# Patient Record
Sex: MALE | Race: WHITE | Employment: FULL TIME | URBAN - NONMETROPOLITAN AREA
[De-identification: names, ages, dates, MRNs, and addresses within clinical notes are randomized per-mention and may not be internally consistent; named-entity substitution may affect disease eponyms.]

---

## 2017-09-23 DIAGNOSIS — I10 ESSENTIAL HYPERTENSION: ICD-10-CM

## 2017-09-25 RX ORDER — AMLODIPINE BESYLATE 10 MG/1
TABLET ORAL
Qty: 30 TABLET | Refills: 0 | Status: SHIPPED | OUTPATIENT
Start: 2017-09-25 | End: 2019-10-15

## 2018-01-24 ENCOUNTER — OFFICE VISIT (OUTPATIENT)
Dept: FAMILY MEDICINE | Facility: OTHER | Age: 43
End: 2018-01-24
Attending: FAMILY MEDICINE

## 2018-01-24 VITALS
BODY MASS INDEX: 40.09 KG/M2 | TEMPERATURE: 98.2 F | WEIGHT: 280 LBS | SYSTOLIC BLOOD PRESSURE: 136 MMHG | DIASTOLIC BLOOD PRESSURE: 82 MMHG | HEIGHT: 70 IN | HEART RATE: 84 BPM | OXYGEN SATURATION: 96 %

## 2018-01-24 DIAGNOSIS — H61.21 EXCESSIVE CERUMEN IN RIGHT EAR CANAL: ICD-10-CM

## 2018-01-24 DIAGNOSIS — I10 BENIGN ESSENTIAL HYPERTENSION: ICD-10-CM

## 2018-01-24 DIAGNOSIS — Z00.8 ENCOUNTER FOR BIOMETRIC SCREENING: ICD-10-CM

## 2018-01-24 DIAGNOSIS — J06.9 VIRAL URI WITH COUGH: Primary | ICD-10-CM

## 2018-01-24 DIAGNOSIS — E66.01 MORBID OBESITY (H): ICD-10-CM

## 2018-01-24 DIAGNOSIS — J34.89 NASAL SORE: ICD-10-CM

## 2018-01-24 LAB
CHOLEST SERPL-MCNC: 234 MG/DL
GLUCOSE SERPL-MCNC: 103 MG/DL (ref 70–99)
HDLC SERPL-MCNC: 48 MG/DL
LDLC SERPL CALC-MCNC: 158 MG/DL
NONHDLC SERPL-MCNC: 186 MG/DL
TRIGL SERPL-MCNC: 138 MG/DL

## 2018-01-24 PROCEDURE — 82947 ASSAY GLUCOSE BLOOD QUANT: CPT | Performed by: FAMILY MEDICINE

## 2018-01-24 PROCEDURE — 99213 OFFICE O/P EST LOW 20 MIN: CPT | Performed by: FAMILY MEDICINE

## 2018-01-24 PROCEDURE — 80061 LIPID PANEL: CPT | Performed by: FAMILY MEDICINE

## 2018-01-24 PROCEDURE — 36415 COLL VENOUS BLD VENIPUNCTURE: CPT | Performed by: FAMILY MEDICINE

## 2018-01-24 ASSESSMENT — PAIN SCALES - GENERAL: PAINLEVEL: NO PAIN (0)

## 2018-01-24 NOTE — NURSING NOTE
"Chief Complaint   Patient presents with     URI       Initial /82 (BP Location: Left arm, Patient Position: Chair, Cuff Size: Adult Large)  Pulse 84  Temp 98.2  F (36.8  C) (Tympanic)  Ht 5' 10\" (1.778 m)  Wt 280 lb (127 kg)  SpO2 96%  BMI 40.18 kg/m2 Estimated body mass index is 40.18 kg/(m^2) as calculated from the following:    Height as of this encounter: 5' 10\" (1.778 m).    Weight as of this encounter: 280 lb (127 kg).  Medication Reconciliation: complete     Lesly Love   "

## 2018-01-24 NOTE — PROGRESS NOTES
"  SUBJECTIVE:   Alberto Vieira is a 42 year old male who presents to clinic today for the following health issues:      RESPIRATORY SYMPTOMS      Duration: 3 days    Description  Chills, hot flashes, stuffy nose, sore throat, dry cough, vomited once, diarrhea for 2 days, sneezing    Severity: moderate    Accompanying signs and symptoms: None    History (predisposing factors):  none    Precipitating or alleviating factors: None    Therapies tried and outcome:  None    Initially started feeling unwell with chills and sweats aout 7 days ago. No other symptoms, but resolve din 24 hrs. Three days ago started getting sinus congestion, drainage, dry cough, ear pressure. Also vomited once 3 days ago and has been having non bloody loose stools. No fevers. Minimal appetite. Stooling is better. Cough improved today. Has sore in the left nares that started with the nasal congestion. Large scab now noted. Needs note for work because he missed 2 days. 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     Problem list and histories reviewed & adjusted, as indicated.  Additional history: as documented    Labs reviewed in EPIC    Reviewed and updated as needed this visit by clinical staff  Tobacco  Allergies  Meds  Med Hx  Surg Hx  Fam Hx  Soc Hx      Reviewed and updated as needed this visit by Provider         ROS:  Constitutional, HEENT, cardiovascular, pulmonary, gi and gu systems are negative, except as otherwise noted.    OBJECTIVE:     /82 (BP Location: Left arm, Patient Position: Chair, Cuff Size: Adult Large)  Pulse 84  Temp 98.2  F (36.8  C) (Tympanic)  Ht 5' 10\" (1.778 m)  Wt 280 lb (127 kg)  SpO2 96%  BMI 40.18 kg/m2  Body mass index is 40.18 kg/(m^2).  GENERAL: healthy, alert and no distress  EYES: Eyes grossly normal to inspection,  conjunctivae and sclerae normal  HENT: Left TM occluded by wax, unable to clear with curette and lavage. Right TM clear. Large scabbed over lesion of left nares. Oropharynx non " erythematous.   NECK: no adenopathy, no asymmetry, masses, or scars and thyroid normal to palpation  RESP: lungs clear to auscultation - no rales, rhonchi or wheezes  CV: regular rate and rhythm, normal S1 S2, no S3 or S4, no murmur, click or rub, no peripheral edema   ABDOMEN: soft, nontender, no hepatosplenomegaly, no masses and bowel sounds normal  MS: no gross musculoskeletal defects noted, no edema    Diagnostic Test Results:  none     ASSESSMENT/PLAN:       1. Viral URI with cough  Several days or URI symptoms, actually seems to be improving today. Note for work given. Supportive cares discussed    2. Nasal sore  Apply Vaseline/antibiotic ointment to the area to keep moist. If not healing over the next weeks, please follow up .    3. Encounter for biometric screening  Brings in insurance form. Fasting. Will get labs today. Needs physical, he is aware.   - Lipid Profile (Chol, Trig, HDL, LDL calc)  - Glucose    4. Excessive cerumen in right ear canal  Unable to clear the right ear canal without significant discomfort. Referral for ear cleaning made.   - OTOLARYNGOLOGY REFERRAL    5. Morbid obesity (H) / Benign essential hypertension  BP borderline today. Lipids elevated today, ASCVD risk < 5%, however LDL is near cut off of 160. Advised discussion with PCP and weight loss. Pt is currently on a limited carb diet, encouraged this.     Aleksandra Lewis MD  [facility]

## 2018-01-24 NOTE — PATIENT INSTRUCTIONS

## 2018-01-24 NOTE — MR AVS SNAPSHOT
After Visit Summary   1/24/2018    Alberto Viiera    MRN: [id number]           Patient Information     Date Of Birth          1975        Visit Information        Provider Department      1/24/2018 11:30 AM Aleksandra Lewis MD [facility]        Today's Diagnoses     Viral URI with cough    -  1    Nasal sore        Encounter for biometric screening          Care Instructions      Viral Upper Respiratory Illness (Adult)  You have a viral upper respiratory illness (URI), which is another term for the common cold. This illness is contagious during the first few days. It is spread through the air by coughing and sneezing. It may also be spread by direct contact (touching the sick person and then touching your own eyes, nose, or mouth). Frequent handwashing will decrease risk of spread. Most viral illnesses go away within 7 to 10 days with rest and simple home remedies. Sometimes the illness may last for several weeks. Antibiotics will not kill a virus, and they are generally not prescribed for this condition.    Home care    If symptoms are severe, rest at home for the first 2 to 3 days. When you resume activity, don't let yourself get too tired.    Avoid being exposed to cigarette smoke (yours or others ).    You may use acetaminophen or ibuprofen to control pain and fever, unless another medicine was prescribed. (Note: If you have chronic liver or kidney disease, have ever had a stomach ulcer or gastrointestinal bleeding, or are taking blood-thinning medicines, talk with your healthcare provider before using these medicines.) Aspirin should never be given to anyone under 18 years of age who is ill with a viral infection or fever. It may cause severe liver or brain damage.    Your appetite may be poor, so a light diet is fine. Avoid dehydration by drinking 6 to 8 glasses of fluids per day (water, soft drinks, juices, tea, or soup). Extra fluids will help loosen secretions in the nose  and lungs.    Over-the-counter cold medicines will not shorten the length of time you re sick, but they may be helpful for the following symptoms: cough, sore throat, and nasal and sinus congestion. (Note: Do not use decongestants if you have high blood pressure.)  Follow-up care  Follow up with your healthcare provider, or as advised.  When to seek medical advice  Call your healthcare provider right away if any of these occur:    Cough with lots of colored sputum (mucus)    Severe headache; face, neck, or ear pain    Difficulty swallowing due to throat pain    Fever of 100.4 F (38 C)  Call 911, or get immediate medical care  Call emergency services right away if any of these occur:    Chest pain, shortness of breath, wheezing, or difficulty breathing    Coughing up blood    Inability to swallow due to throat pain  Date Last Reviewed: 9/13/2015 2000-2017 [employer]. [address] [place] [address]. All rights reserved. This information is not intended as a substitute for professional medical care. Always follow your healthcare professional's instructions.                Follow-ups after your visit        Follow-up notes from your care team     Return if symptoms worsen or fail to improve.      Who to contact     If you have questions or need follow up information about today's clinic visit or your schedule please contact [facility] directly at [phone].  Normal or non-critical lab and imaging results will be communicated to you by MyChart, letter or phone within 4 business days after the clinic has received the results. If you do not hear from us within 7 days, please contact the clinic through MyChart or phone. If you have a critical or abnormal lab result, we will notify you by phone as soon as possible.  Submit refill requests through [employer] or call your pharmacy and they will forward the refill request to us. Please allow 3 business days for your refill to be  "completed.          Additional Information About Your Visit        [employer] Information     [employer] lets you send messages to your doctor, view your test results, renew your prescriptions, schedule appointments and more. To sign up, go to [email or web address] . Click on \"Log in\" on the left side of the screen, which will take you to the Welcome page. Then click on \"Sign up Now\" on the right side of the page.     You will be asked to enter the access code listed below, as well as some personal information. Please follow the directions to create your username and password.     Your access code is: [email or web address]  Expires: 2018 11:55 AM     Your access code will  in 90 days. If you need help or a new code, please call your [place] clinic or [phone].        Care EveryWhere ID     This is your [facility] EveryWhere ID. This could be used by other organizations to access your [place] medical records  [id number]        Your Vitals Were     Pulse Temperature Height Pulse Oximetry BMI (Body Mass Index)       84 98.2  F (36.8  C) (Tympanic) 5' 10\" (1.778 m) 96% 40.18 kg/m2        Blood Pressure from Last 3 Encounters:   18 136/82   07/31/15 126/86   07/10/15 128/82    Weight from Last 3 Encounters:   18 280 lb (127 kg)   07/31/15 285 lb (129.3 kg)   07/10/15 280 lb (127 kg)              We Performed the Following     Glucose     Lipid Profile (Chol, Trig, HDL, LDL calc)        Primary Care Provider Office Phone # Fax #    SERENE Michael [phone]       [facility] [address] [address]        Equal Access to Services     ELIAS ZAPATA : Joe Browne, joshua arriazaadaha, qamarco kaalmada adeanayada, melvin srivastava. So [facility] [phone].    ATENCIÓN: Si habla español, tiene a begum disposición servicios gratuitos de asistencia lingüística. Jimi al [phone].    We comply with applicable federal civil rights " laws and [place] laws. We do not discriminate on the basis of race, color, national origin, age, disability, sex, sexual orientation, or gender identity.            Thank you!     Thank you for choosing [facility]  for your care. Our goal is always to provide you with excellent care. Hearing back from our patients is one way we can continue to improve our services. Please take a few minutes to complete the written survey that you may receive in the mail after your visit with us. Thank you!             Your Updated Medication List - Protect others around you: Learn how to safely use, store and throw away your medicines at www.disposemymeds.org.          This list is accurate as of 1/24/18 11:55 AM.  Always use your most recent med list.                   Brand Name Dispense Instructions for use Diagnosis    amLODIPine 10 MG tablet    NORVASC    30 tablet    TAKE ONE TABLET BY MOUTH ONCE DAILY    Essential hypertension       cyclobenzaprine 10 MG tablet    FLEXERIL    90 tablet    Take 1 tablet (10 mg) by mouth 3 times daily as needed for muscle spasms    Lumbar disc herniation with radiculopathy       naproxen 500 MG tablet    NAPROSYN    90 tablet    Take 1 tablet (500 mg) by mouth 3 times daily (with meals)    Lumbar disc herniation with radiculopathy

## 2018-01-24 NOTE — LETTER
January 24, 2018      lAberto Vieira  [address] [address] [address]        To Whom It May Concern:    Alberto Vieira  was seen on 1/24/2018.  Please excuse him from work  1/21-1/24 due to acute illness.        Sincerely,        Aleksandra Lewis MD

## 2018-01-29 ENCOUNTER — OFFICE VISIT (OUTPATIENT)
Dept: OTOLARYNGOLOGY | Facility: OTHER | Age: 43
End: 2018-01-29
Attending: FAMILY MEDICINE

## 2018-01-29 VITALS
WEIGHT: 280 LBS | TEMPERATURE: 98.3 F | DIASTOLIC BLOOD PRESSURE: 80 MMHG | HEART RATE: 72 BPM | OXYGEN SATURATION: 95 % | BODY MASS INDEX: 40.09 KG/M2 | HEIGHT: 70 IN | SYSTOLIC BLOOD PRESSURE: 138 MMHG

## 2018-01-29 DIAGNOSIS — Z71.6 TOBACCO ABUSE COUNSELING: ICD-10-CM

## 2018-01-29 DIAGNOSIS — H61.23 BILATERAL IMPACTED CERUMEN: Primary | ICD-10-CM

## 2018-01-29 DIAGNOSIS — H69.93 DYSFUNCTION OF BOTH EUSTACHIAN TUBES: ICD-10-CM

## 2018-01-29 DIAGNOSIS — J34.2 DNS (DEVIATED NASAL SEPTUM): ICD-10-CM

## 2018-01-29 PROCEDURE — 92504 EAR MICROSCOPY EXAMINATION: CPT | Performed by: NURSE PRACTITIONER

## 2018-01-29 PROCEDURE — 99213 OFFICE O/P EST LOW 20 MIN: CPT | Mod: 25 | Performed by: NURSE PRACTITIONER

## 2018-01-29 ASSESSMENT — PAIN SCALES - GENERAL: PAINLEVEL: NO PAIN (0)

## 2018-01-29 NOTE — MR AVS SNAPSHOT
After Visit Summary   1/29/2018    Alberto Vieira    MRN: [id number]           Patient Information     Date Of Birth          1975        Visit Information        Provider Department      1/29/2018 2:00 PM Latha Granger APRN CNP [facility]        Care Instructions    Ears cleaned today. Look good.    No q-tips.    Use Flonase 2 sprays once daily x 2-3 weeks.     Follow up in ENT as needed.      Thank you for allowing Latha Granger CNP and our ENT team to participate in your care.  If your medications are too expensive, please give the nurse a call.  We can possibly change this medication.  If you have a scheduling or an appointment question please contact [facility] Unit Coordinator at their direct line [phone].   ALL nursing questions or concerns can be directed to your ENT nurse at: [phone]- alex            Follow-ups after your visit        Follow-up notes from your care team     Return if symptoms worsen or fail to improve.      Who to contact     If you have questions or need follow up information about today's clinic visit or your schedule please contact [facility] directly at [phone].  Normal or non-critical lab and imaging results will be communicated to you by MyChart, letter or phone within 4 business days after the clinic has received the results. If you do not hear from us within 7 days, please contact the clinic through [employer] or phone. If you have a critical or abnormal lab result, we will notify you by phone as soon as possible.  Submit refill requests through [employer] or call your pharmacy and they will forward the refill request to us. Please allow 3 business days for your refill to be completed.          Additional Information About Your Visit        [employer] Information     [employer] lets you send messages to your doctor, view your test results, renew your prescriptions, schedule appointments and more. To sign up, go to  [email or web address] . Click on \"Log in\" on the left side of the screen, which will take you to the Welcome page. Then click on \"Sign up Now\" on the right side of the page.     You will be asked to enter the access code listed below, as well as some personal information. Please follow the directions to create your username and password.     Your access code is: [email or web address]  Expires: 2018 11:55 AM     Your access code will  in 90 days. If you need help or a new code, please call your [place] clinic or [phone].        Care EveryWhere ID     This is your Care EveryWhere ID. This could be used by other organizations to access your [place] medical records  [id number]        Your Vitals Were     Pulse Temperature Height Pulse Oximetry BMI (Body Mass Index)       72 98.3  F (36.8  C) (Tympanic) 5' 10\" (1.778 m) 95% 40.18 kg/m2        Blood Pressure from Last 3 Encounters:   18 138/80   18 136/82   07/31/15 126/86    Weight from Last 3 Encounters:   18 280 lb (127 kg)   18 280 lb (127 kg)   07/31/15 285 lb (129.3 kg)              Today, you had the following     No orders found for display       Primary Care Provider Office Phone # Fax #    RodySERENE Waddell [phone]       [address] [address]        Equal Access to Services     ELIAS ZAPATA : Hadii nerissa yost hadasho Soomaali, waaxda luqadaha, qaybta kaalmada adeegyada, melvin srivastava. So [facility] [phone].    ATENCIÓN: Si habla español, tiene a begum disposición servicios gratuitos de asistencia lingüística. Llame al [phone].    We comply with applicable federal civil rights laws and [place] laws. We do not discriminate on the basis of race, color, national origin, age, disability, sex, sexual orientation, or gender identity.            Thank you!     Thank you for choosing [facility]  for your care. Our goal is always " to provide you with excellent care. Hearing back from our patients is one way we can continue to improve our services. Please take a few minutes to complete the written survey that you may receive in the mail after your visit with us. Thank you!             Your Updated Medication List - Protect others around you: Learn how to safely use, store and throw away your medicines at www.disposemymeds.org.          This list is accurate as of 1/29/18  2:31 PM.  Always use your most recent med list.                   Brand Name Dispense Instructions for use Diagnosis    amLODIPine 10 MG tablet    NORVASC    30 tablet    TAKE ONE TABLET BY MOUTH ONCE DAILY    Essential hypertension       cyclobenzaprine 10 MG tablet    FLEXERIL    90 tablet    Take 1 tablet (10 mg) by mouth 3 times daily as needed for muscle spasms    Lumbar disc herniation with radiculopathy       naproxen 500 MG tablet    NAPROSYN    90 tablet    Take 1 tablet (500 mg) by mouth 3 times daily (with meals)    Lumbar disc herniation with radiculopathy

## 2018-01-29 NOTE — PROGRESS NOTES
Otolaryngology Note    Patient: Alberto Vieiar  : 1975         Chief Complaint:     Patient presents with:  Cerumen Impaction: ear cleaning         History of Present Illness:     Alberto Vieira is a 42 year old male seen today for ear cleaning. Was recently into clinic for URI, was told he had cerumen impaction.     Recent cold, feels ears intermittently plugged up. Typically can get his ears to pop. Some mild otalgia left ear last night, resolved since then. No otorrhea. Intermittent tinnitus x 1 year. No vertigo. No facial weakness/paresthesias or dysphagia. States he will try to use a paperclip at times to clean his ears.     Recent audiogram at work, states normal. Generally has no concerns with his hearing.   No COM, otologic surgeries or trauma.  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          Medications:     Current Outpatient Rx   Medication Sig Dispense Refill     amLODIPine (NORVASC) 10 MG tablet TAKE ONE TABLET BY MOUTH ONCE DAILY (Patient not taking: Reported on 2018) 30 tablet 0     cyclobenzaprine (FLEXERIL) 10 MG tablet Take 1 tablet (10 mg) by mouth 3 times daily as needed for muscle spasms (Patient not taking: Reported on 2018) 90 tablet 1     naproxen (NAPROSYN) 500 MG tablet Take 1 tablet (500 mg) by mouth 3 times daily (with meals) (Patient not taking: Reported on 2018) 90 tablet 6            Allergies:     Allergies: Review of patient's allergies indicates no known allergies.          Past Medical History:     No past medical history on file.         Past Surgical History:     No past surgical history on file.    ENT family history reviewed         Social History:     Social History   Substance Use Topics     Smoking status: Former Smoker     Smokeless tobacco: Current User     Types: Chew     Alcohol use Yes            Review of Systems:     ROS: 10  "point ROS neg other than the symptoms noted above in the HPI.         Physical Exam:     /80 (BP Location: Right arm, Patient Position: Chair, Cuff Size: Adult Large)  Pulse 72  Temp 98.3  F (36.8  C) (Tympanic)  Ht 5' 10\" (1.778 m)  Wt 280 lb (127 kg)  SpO2 95%  BMI 40.18 kg/m2  General - The patient is well nourished and well developed, and appears to have good nutritional status.  Alert and oriented to person and place, answers questions and cooperates with examination appropriately.   Head and Face - Normocephalic and atraumatic, with no gross asymmetry noted.  The facial nerve is intact, with strong symmetric movements.  Voice and Breathing - The patient was breathing comfortably without the use of accessory muscles. There was no wheezing, stridor. The patients voice was clear and strong, and had appropriate pitch and quality.  Ears - External ear normal. The ear canals were examined underneath the operating microscope and with an otologic speculum. The canals were cleaned of all debris with gently suctioning and use of alligator forceps. There is no granulation tissue or sign of cholesteatoma. The patient tolerates this well. TM's are intact without effusion.  Eyes - Extraocular movements intact, and the pupils were reactive to light. Sclera were not icteric or injected, conjunctiva were pink and moist.  Mouth - Examination of the oral cavity showed pink, healthy oral mucosa. Dentition in good condition. No lesions or ulcerations noted. The tongue was mobile and midline.   Throat - The walls of the oropharynx were smooth, pink, moist, symmetric, and had no lesions or ulcerations.  The tonsillar pillars and soft palate were symmetric. The uvula was midline on elevation.    Neck - Normal midline excursion of the laryngotracheal complex during swallowing.  Full range of motion on passive movement.  Palpation of the occipital, submental, submandibular, internal jugular chain, and supraclavicular nodes " did not demonstrate any abnormal lymph nodes or masses.  Palpation of the thyroid was soft and smooth, with no nodules or goiter appreciated.  The trachea was mobile and midline.  Nose - External contour is symmetric, no gross deflection or scars.  Nasal mucosa is pink and moist with no abnormal mucus.  The septum and turbinates were evaluated: DNS right.  No polyps, masses, or purulence noted on examination.         Assessment and Plan:     (H61.23) Bilateral impacted cerumen  (primary encounter diagnosis)  The ears were cleaned today. Aural hygiene for the ear canals was discussed.  Avoidance of Q-tips was highly encouraged. Advised against the use of paperclips. The patient was told to avoid flushing the ear canal as there is a risk of perforating the ear drum.  Recommend annual audiograms, sooner with any acute changes in hearing. The patient may return here as needed.    (J34.2) DNS (deviated nasal septum)  Comment: right, asymptomatic  Plan: Ongoing surveillance. Follow up PRN    (Z71.6) Tobacco abuse counseling  Discussed with patient that it takes 20 years of quitting tobacco to be at same risk of developing head and neck cancer as a person who has never used tobacco. Patient voiced understanding to ongoing risks associated with continued tobacco use. Tobacco cessation was strongly encouraged.    (H69.83) Eustachian Tube Dysfunction  Comment: Likely related to recent cold.  Plan: Education on eustachian tube dysfunction was provided.  This can occur in patients after an upper respiratory infection, as a complication of allergies, after altitude changes, as a sequelae of nasal anatomic abnormalities or as part of an genetic tendency.  Eustachian tube exercises were demonstrated and can be helpful.   Medical management can be in the form of decongestants used temporarily and with an understanding of side effects to blood pressure and prostate problems.  Nasal steroids can be of benefit and may take a few week  to obtain maximum benefit.  Antihistamines are another option.    If problems persist radiographic work-up for sinus problems can include a CT scan of the paranasal sinuses looking for anatomic abnormalities. A complete allergy work-up may also be obtained.  Recommend Flonase 2 sprays once daily x 2-3 weeks. Follow up PRN/symptoms do not improve after 6 weeks.  Recommended to f/u in ENT as needed.    Latha Granger NP  ENT  [facility], [place]  [phone]

## 2018-01-29 NOTE — NURSING NOTE
"Chief Complaint   Patient presents with     Cerumen Impaction     ear cleaning       Initial /80 (BP Location: Right arm, Patient Position: Chair, Cuff Size: Adult Large)  Pulse 72  Temp 98.3  F (36.8  C) (Tympanic)  Ht 5' 10\" (1.778 m)  Wt 280 lb (127 kg)  SpO2 95%  BMI 40.18 kg/m2 Estimated body mass index is 40.18 kg/(m^2) as calculated from the following:    Height as of this encounter: 5' 10\" (1.778 m).    Weight as of this encounter: 280 lb (127 kg).  Medication Reconciliation: complete     Jes Parisi LPN      "

## 2018-01-29 NOTE — LETTER
2018         RE: Alberto Vieira  [address] [facility] [address]        Dear Colleague,    Thank you for referring your patient, Alberto Vieira, to the [facility]. Please see a copy of my visit note below.    Otolaryngology Note    Patient: Alberto Vieira  : 1975         Chief Complaint:     Patient presents with:  Cerumen Impaction: ear cleaning         History of Present Illness:     Alberto Vieira is a 42 year old male seen today for ear cleaning. Was recently into clinic for URI, was told he had cerumen impaction.     Recent cold, feels ears intermittently plugged up. Typically can get his ears to pop. Some mild otalgia left ear last night, resolved since then. No otorrhea. Intermittent tinnitus x 1 year. No vertigo. No facial weakness/paresthesias or dysphagia. States he will try to use a paperclip at times to clean his ears.     Recent audiogram at work, states normal. Generally has no concerns with his hearing.   No COM, otologic surgeries or trauma.  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          Medications:     Current Outpatient Rx   Medication Sig Dispense Refill     amLODIPine (NORVASC) 10 MG tablet TAKE ONE TABLET BY MOUTH ONCE DAILY (Patient not taking: Reported on 2018) 30 tablet 0     cyclobenzaprine (FLEXERIL) 10 MG tablet Take 1 tablet (10 mg) by mouth 3 times daily as needed for muscle spasms (Patient not taking: Reported on 2018) 90 tablet 1     naproxen (NAPROSYN) 500 MG tablet Take 1 tablet (500 mg) by mouth 3 times daily (with meals) (Patient not taking: Reported on 2018) 90 tablet 6            Allergies:     Allergies: Review of patient's allergies indicates no known allergies.          Past Medical History:     No past medical history on file.         Past Surgical History:     No past surgical history on  "file.    ENT family history reviewed         Social History:     Social History   Substance Use Topics     Smoking status: Former Smoker     Smokeless tobacco: Current User     Types: Chew     Alcohol use Yes            Review of Systems:     ROS: 10 point ROS neg other than the symptoms noted above in the HPI.         Physical Exam:     /80 (BP Location: Right arm, Patient Position: Chair, Cuff Size: Adult Large)  Pulse 72  Temp 98.3  F (36.8  C) (Tympanic)  Ht 5' 10\" (1.778 m)  Wt 280 lb (127 kg)  SpO2 95%  BMI 40.18 kg/m2  General - The patient is well nourished and well developed, and appears to have good nutritional status.  Alert and oriented to person and place, answers questions and cooperates with examination appropriately.   Head and Face - Normocephalic and atraumatic, with no gross asymmetry noted.  The facial nerve is intact, with strong symmetric movements.  Voice and Breathing - The patient was breathing comfortably without the use of accessory muscles. There was no wheezing, stridor. The patients voice was clear and strong, and had appropriate pitch and quality.  Ears - External ear normal. The ear canals were examined underneath the operating microscope and with an otologic speculum. The canals were cleaned of all debris with gently suctioning and use of alligator forceps. There is no granulation tissue or sign of cholesteatoma. The patient tolerates this well. TM's are intact without effusion.  Eyes - Extraocular movements intact, and the pupils were reactive to light. Sclera were not icteric or injected, conjunctiva were pink and moist.  Mouth - Examination of the oral cavity showed pink, healthy oral mucosa. Dentition in good condition. No lesions or ulcerations noted. The tongue was mobile and midline.   Throat - The walls of the oropharynx were smooth, pink, moist, symmetric, and had no lesions or ulcerations.  The tonsillar pillars and soft palate were symmetric. The uvula was " midline on elevation.    Neck - Normal midline excursion of the laryngotracheal complex during swallowing.  Full range of motion on passive movement.  Palpation of the occipital, submental, submandibular, internal jugular chain, and supraclavicular nodes did not demonstrate any abnormal lymph nodes or masses.  Palpation of the thyroid was soft and smooth, with no nodules or goiter appreciated.  The trachea was mobile and midline.  Nose - External contour is symmetric, no gross deflection or scars.  Nasal mucosa is pink and moist with no abnormal mucus.  The septum and turbinates were evaluated: DNS right.  No polyps, masses, or purulence noted on examination.         Assessment and Plan:     (H61.23) Bilateral impacted cerumen  (primary encounter diagnosis)  The ears were cleaned today. Aural hygiene for the ear canals was discussed.  Avoidance of Q-tips was highly encouraged. Advised against the use of paperclips. The patient was told to avoid flushing the ear canal as there is a risk of perforating the ear drum.  Recommend annual audiograms, sooner with any acute changes in hearing. The patient may return here as needed.    (J34.2) DNS (deviated nasal septum)  Comment: right, asymptomatic  Plan: Ongoing surveillance. Follow up PRN    (Z71.6) Tobacco abuse counseling  Discussed with patient that it takes 20 years of quitting tobacco to be at same risk of developing head and neck cancer as a person who has never used tobacco. Patient voiced understanding to ongoing risks associated with continued tobacco use. Tobacco cessation was strongly encouraged.    (H69.83) Eustachian Tube Dysfunction  Comment: Likely related to recent cold.  Plan: Education on eustachian tube dysfunction was provided.  This can occur in patients after an upper respiratory infection, as a complication of allergies, after altitude changes, as a sequelae of nasal anatomic abnormalities or as part of an genetic tendency.  Eustachian tube  exercises were demonstrated and can be helpful.   Medical management can be in the form of decongestants used temporarily and with an understanding of side effects to blood pressure and prostate problems.  Nasal steroids can be of benefit and may take a few week to obtain maximum benefit.  Antihistamines are another option.    If problems persist radiographic work-up for sinus problems can include a CT scan of the paranasal sinuses looking for anatomic abnormalities. A complete allergy work-up may also be obtained.  Recommend Flonase 2 sprays once daily x 2-3 weeks. Follow up PRN/symptoms do not improve after 6 weeks.  Recommended to f/u in ENT as needed.    Latha Granger NP  ENT  [facility], [place]  [phone]      Again, thank you for allowing me to participate in the care of your patient.        Sincerely,        ERINN Hendricks CNP

## 2018-01-29 NOTE — PATIENT INSTRUCTIONS
Ears cleaned today. Look good.    No q-tips.    Use Flonase 2 sprays once daily x 2-3 weeks.     Follow up in ENT as needed.      Thank you for allowing Latha Granger CNP and our ENT team to participate in your care.  If your medications are too expensive, please give the nurse a call.  We can possibly change this medication.  If you have a scheduling or an appointment question please contact [facility] Coordinator at their direct line [phone].   ALL nursing questions or concerns can be directed to your ENT nurse at: [phone]- Vince

## 2019-10-09 NOTE — PROGRESS NOTES
Subjective     Alberto Vieira is a 44 year old male who presents to clinic today for the following health issues:    HPI   Musculoskeletal problem/pain      Duration: have had pain whole life in left hip     Description  Location: left hip a month ago flare up pain intense     Intensity:  moderate, severe    Accompanying signs and symptoms: radiation of pain to bottom of the foot    History  Previous similar problem: YES  Previous evaluation:  x-ray    Precipitating or alleviating factors:  Trauma or overuse: YES  Aggravating factors include: sitting, standing, walking, climbing stairs, lifting, exercise and overuse    Therapies tried and outcome: rest/inactivity, heat, ice, immobilization, stretching, acetaminophen, NSAID - health source in [place] - chiropractor    - last imaging 6/26/2015  - did get the injection 8/17/2015  - originial injury: splits playing boot hockey at age 13  - dx w/ AVN   - would like hip replacement  - only getting four hours of sleep per night d/t pain  - history of back injection w/ good relief and no need for repeat injection  - flexeril help with pain and sleep      # HTN  - was on amlodipine 10mg, but is not taking d/t weight loss and BP at goal      Patient Active Problem List   Diagnosis     Morbid obesity (H)     History reviewed. No pertinent surgical history.    Social History     Tobacco Use     Smoking status: Former Smoker     Smokeless tobacco: Current User     Types: Chew   Substance Use Topics     Alcohol use: Yes     Family History   Problem Relation Age of Onset     Heart Disease Father      Cerebrovascular Disease Maternal Grandfather          Current Outpatient Medications   Medication Sig Dispense Refill     cyclobenzaprine (FLEXERIL) 10 MG tablet Take 1 tablet (10 mg) by mouth 3 times daily as needed for muscle spasms 30 tablet 1     naproxen (NAPROSYN) 500 MG tablet Take 1 tablet (500 mg) by mouth 3 times daily (with meals) (Patient not taking: Reported on 1/24/2018)  90 tablet 6     No Known Allergies  BP Readings from Last 3 Encounters:   10/15/19 132/88   01/29/18 138/80   01/24/18 136/82    Wt Readings from Last 3 Encounters:   10/15/19 131.5 kg (290 lb)   01/29/18 127 kg (280 lb)   01/24/18 127 kg (280 lb)              Reviewed and updated as needed this visit by Provider  Tobacco  Allergies  Meds  Problems  Med Hx  Surg Hx  Fam Hx         Review of Systems   Constitutional: Negative for chills and fever.   HENT: Negative for congestion.    Respiratory: Negative for cough and shortness of breath.    Cardiovascular: Negative for chest pain and palpitations.   Gastrointestinal: Negative for abdominal pain, constipation, diarrhea, nausea and vomiting.   Musculoskeletal: Positive for arthralgias (left hip).            Objective    /88 (BP Location: Left leg, Patient Position: Sitting, Cuff Size: Adult Large)   Pulse 67   Temp 97.6  F (36.4  C)   Resp 20   Wt 131.5 kg (290 lb)   SpO2 96%   BMI 41.61 kg/m    Body mass index is 41.61 kg/m .  Physical Exam  Constitutional:       General: He is not in acute distress.     Appearance: He is obese.   Cardiovascular:      Rate and Rhythm: Normal rate and regular rhythm.      Pulses: Normal pulses.      Heart sounds: No murmur.   Pulmonary:      Effort: Pulmonary effort is normal. No respiratory distress.      Breath sounds: No wheezing or rales.   Abdominal:      General: Bowel sounds are normal. There is no distension.      Palpations: Abdomen is soft.      Tenderness: There is no tenderness.   Musculoskeletal:      Comments: Left hip: Flexion and extension full ROM, external rotation slightly reduced, very limited internal rotation to ~ 10 deg. Antalgic gait. Leg muscle atrophy when compared to right leg. Strength, hip flexion, knee extension/flexion 4/5. Neurobascularly intact.    Neurological:      Mental Status: He is alert.            Diagnostic Test Results:  Results for orders placed or performed in visit on  10/15/19 (from the past 24 hour(s))   Lipid Profile   Result Value Ref Range    Cholesterol 230 (H) <200 mg/dL    Triglycerides 150 (H) <150 mg/dL    HDL Cholesterol 39 (L) >39 mg/dL    LDL Cholesterol Calculated 161 (H) <100 mg/dL    Non HDL Cholesterol 191 (H) <130 mg/dL   CBC with platelets differential   Result Value Ref Range    WBC 5.0 4.0 - 11.0 10e9/L    RBC Count 5.49 4.4 - 5.9 10e12/L    Hemoglobin 17.4 13.3 - 17.7 g/dL    Hematocrit 50.9 40.0 - 53.0 %    MCV 93 78 - 100 fl    MCH 31.7 26.5 - 33.0 pg    MCHC 34.2 31.5 - 36.5 g/dL    RDW 11.9 10.0 - 15.0 %    Platelet Count 198 150 - 450 10e9/L    Diff Method Automated Method     % Neutrophils 45.5 %    % Lymphocytes 40.5 %    % Monocytes 11.0 %    % Eosinophils 1.8 %    % Basophils 1.0 %    % Immature Granulocytes 0.2 %    Nucleated RBCs 0 0 /100    Absolute Neutrophil 2.3 1.6 - 8.3 10e9/L    Absolute Lymphocytes 2.0 0.8 - 5.3 10e9/L    Absolute Monocytes 0.6 0.0 - 1.3 10e9/L    Absolute Eosinophils 0.1 0.0 - 0.7 10e9/L    Absolute Basophils 0.1 0.0 - 0.2 10e9/L    Abs Immature Granulocytes 0.0 0 - 0.4 10e9/L    Absolute Nucleated RBC 0.0    Comprehensive metabolic panel   Result Value Ref Range    Sodium 135 133 - 144 mmol/L    Potassium 4.4 3.4 - 5.3 mmol/L    Chloride 101 94 - 109 mmol/L    Carbon Dioxide 30 20 - 32 mmol/L    Anion Gap 4 3 - 14 mmol/L    Glucose 148 (H) 70 - 99 mg/dL    Urea Nitrogen 14 7 - 30 mg/dL    Creatinine 1.18 0.66 - 1.25 mg/dL    GFR Estimate 75 >60 mL/min/[1.73_m2]    GFR Estimate If Black 87 >60 mL/min/[1.73_m2]    Calcium 9.1 8.5 - 10.1 mg/dL    Bilirubin Total 0.7 0.2 - 1.3 mg/dL    Albumin 4.3 3.4 - 5.0 g/dL    Protein Total 8.3 6.8 - 8.8 g/dL    Alkaline Phosphatase 67 40 - 150 U/L     (H) 0 - 70 U/L    AST 44 0 - 45 U/L           Assessment & Plan     1. AVN (avascular necrosis of bone) (H)  - ORTHOPEDICS ADULT REFERRAL  - xray pelvis/left hip    2. Lumbar disc herniation with radiculopathy   cyclobenzaprine  (FLEXERIL) 10 MG tablet; Take 1 tablet (10 mg) by mouth 3 times daily as needed for muscle spasms  Dispense: 30 tablet; Refill: 1    3. Encounter for routine adult health examination without abnormal findings  - Lipid Profile  - CBC with platelets differential  - Comprehensive metabolic panel    4. Hyperglycemia: lab was not a fasting glucose  - repeat in 3 months and include A1c    5. Elevated ALT: most likely d/t fatty liver  - repeat labs in 3 months, if still elevated -> US RUQ, hepatitis labs, and consideration for GI consult      # Wellness:  - Immunizations: needs Tdap, influenza  - Alberto will make an appointment for a wellness visit  - Lipids today: ASCVD 3.0%, no statin indicated at this time       See Patient Instructions    Follow up in 3 months or sooner for wellness visit    Next visit:  - Labs: LFTs, A1c      Lata Morley MD  [facility] [place]

## 2019-10-15 ENCOUNTER — OFFICE VISIT (OUTPATIENT)
Dept: FAMILY MEDICINE | Facility: OTHER | Age: 44
End: 2019-10-15
Attending: FAMILY MEDICINE
Payer: COMMERCIAL

## 2019-10-15 VITALS
SYSTOLIC BLOOD PRESSURE: 132 MMHG | RESPIRATION RATE: 20 BRPM | DIASTOLIC BLOOD PRESSURE: 88 MMHG | WEIGHT: 290 LBS | BODY MASS INDEX: 41.61 KG/M2 | OXYGEN SATURATION: 96 % | TEMPERATURE: 97.6 F | HEART RATE: 67 BPM

## 2019-10-15 DIAGNOSIS — R74.01 ELEVATED ALT MEASUREMENT: ICD-10-CM

## 2019-10-15 DIAGNOSIS — Z00.00 ENCOUNTER FOR ROUTINE ADULT HEALTH EXAMINATION WITHOUT ABNORMAL FINDINGS: ICD-10-CM

## 2019-10-15 DIAGNOSIS — M87.00 AVN (AVASCULAR NECROSIS OF BONE) (H): Primary | ICD-10-CM

## 2019-10-15 DIAGNOSIS — R73.9 HYPERGLYCEMIA: ICD-10-CM

## 2019-10-15 DIAGNOSIS — M51.16 LUMBAR DISC HERNIATION WITH RADICULOPATHY: ICD-10-CM

## 2019-10-15 LAB
ALBUMIN SERPL-MCNC: 4.3 G/DL (ref 3.4–5)
ALP SERPL-CCNC: 67 U/L (ref 40–150)
ALT SERPL W P-5'-P-CCNC: 115 U/L (ref 0–70)
ANION GAP SERPL CALCULATED.3IONS-SCNC: 4 MMOL/L (ref 3–14)
AST SERPL W P-5'-P-CCNC: 44 U/L (ref 0–45)
BASOPHILS # BLD AUTO: 0.1 10E9/L (ref 0–0.2)
BASOPHILS NFR BLD AUTO: 1 %
BILIRUB SERPL-MCNC: 0.7 MG/DL (ref 0.2–1.3)
BUN SERPL-MCNC: 14 MG/DL (ref 7–30)
CALCIUM SERPL-MCNC: 9.1 MG/DL (ref 8.5–10.1)
CHLORIDE SERPL-SCNC: 101 MMOL/L (ref 94–109)
CHOLEST SERPL-MCNC: 230 MG/DL
CO2 SERPL-SCNC: 30 MMOL/L (ref 20–32)
CREAT SERPL-MCNC: 1.18 MG/DL (ref 0.66–1.25)
DIFFERENTIAL METHOD BLD: NORMAL
EOSINOPHIL # BLD AUTO: 0.1 10E9/L (ref 0–0.7)
EOSINOPHIL NFR BLD AUTO: 1.8 %
ERYTHROCYTE [DISTWIDTH] IN BLOOD BY AUTOMATED COUNT: 11.9 % (ref 10–15)
GFR SERPL CREATININE-BSD FRML MDRD: 75 ML/MIN/{1.73_M2}
GLUCOSE SERPL-MCNC: 148 MG/DL (ref 70–99)
HCT VFR BLD AUTO: 50.9 % (ref 40–53)
HDLC SERPL-MCNC: 39 MG/DL
HGB BLD-MCNC: 17.4 G/DL (ref 13.3–17.7)
IMM GRANULOCYTES # BLD: 0 10E9/L (ref 0–0.4)
IMM GRANULOCYTES NFR BLD: 0.2 %
LDLC SERPL CALC-MCNC: 161 MG/DL
LYMPHOCYTES # BLD AUTO: 2 10E9/L (ref 0.8–5.3)
LYMPHOCYTES NFR BLD AUTO: 40.5 %
MCH RBC QN AUTO: 31.7 PG (ref 26.5–33)
MCHC RBC AUTO-ENTMCNC: 34.2 G/DL (ref 31.5–36.5)
MCV RBC AUTO: 93 FL (ref 78–100)
MONOCYTES # BLD AUTO: 0.6 10E9/L (ref 0–1.3)
MONOCYTES NFR BLD AUTO: 11 %
NEUTROPHILS # BLD AUTO: 2.3 10E9/L (ref 1.6–8.3)
NEUTROPHILS NFR BLD AUTO: 45.5 %
NONHDLC SERPL-MCNC: 191 MG/DL
NRBC # BLD AUTO: 0 10*3/UL
NRBC BLD AUTO-RTO: 0 /100
PLATELET # BLD AUTO: 198 10E9/L (ref 150–450)
POTASSIUM SERPL-SCNC: 4.4 MMOL/L (ref 3.4–5.3)
PROT SERPL-MCNC: 8.3 G/DL (ref 6.8–8.8)
RBC # BLD AUTO: 5.49 10E12/L (ref 4.4–5.9)
SODIUM SERPL-SCNC: 135 MMOL/L (ref 133–144)
TRIGL SERPL-MCNC: 150 MG/DL
WBC # BLD AUTO: 5 10E9/L (ref 4–11)

## 2019-10-15 PROCEDURE — 36415 COLL VENOUS BLD VENIPUNCTURE: CPT | Performed by: FAMILY MEDICINE

## 2019-10-15 PROCEDURE — 80061 LIPID PANEL: CPT | Performed by: FAMILY MEDICINE

## 2019-10-15 PROCEDURE — 99213 OFFICE O/P EST LOW 20 MIN: CPT | Performed by: FAMILY MEDICINE

## 2019-10-15 PROCEDURE — 80053 COMPREHEN METABOLIC PANEL: CPT | Performed by: FAMILY MEDICINE

## 2019-10-15 PROCEDURE — 85025 COMPLETE CBC W/AUTO DIFF WBC: CPT | Performed by: FAMILY MEDICINE

## 2019-10-15 RX ORDER — CYCLOBENZAPRINE HCL 10 MG
10 TABLET ORAL 3 TIMES DAILY PRN
Qty: 90 TABLET | Refills: 1 | Status: SHIPPED | OUTPATIENT
Start: 2019-10-15 | End: 2019-10-15 | Stop reason: DRUGHIGH

## 2019-10-15 RX ORDER — CYCLOBENZAPRINE HCL 10 MG
10 TABLET ORAL 3 TIMES DAILY PRN
Qty: 30 TABLET | Refills: 1 | Status: SHIPPED | OUTPATIENT
Start: 2019-10-15 | End: 2020-07-27

## 2019-10-15 ASSESSMENT — ENCOUNTER SYMPTOMS
SHORTNESS OF BREATH: 0
CONSTIPATION: 0
CHILLS: 0
ARTHRALGIAS: 1
DIARRHEA: 0
PALPITATIONS: 0
COUGH: 0
FEVER: 0
VOMITING: 0
NAUSEA: 0
ABDOMINAL PAIN: 0

## 2019-10-15 ASSESSMENT — PAIN SCALES - GENERAL: PAINLEVEL: MODERATE PAIN (4)

## 2019-10-15 NOTE — NURSING NOTE
"Chief Complaint   Patient presents with     Musculoskeletal Problem       Initial /88 (BP Location: Left leg, Patient Position: Sitting, Cuff Size: Adult Large)   Pulse 67   Temp 97.6  F (36.4  C)   Resp 20   Wt 131.5 kg (290 lb)   SpO2 96%   BMI 41.61 kg/m   Estimated body mass index is 41.61 kg/m  as calculated from the following:    Height as of 1/29/18: 1.778 m (5' 10\").    Weight as of this encounter: 131.5 kg (290 lb).  Medication Reconciliation: complete  Janie Delgado LPN  "

## 2019-10-23 ENCOUNTER — TRANSFERRED RECORDS (OUTPATIENT)
Dept: HEALTH INFORMATION MANAGEMENT | Facility: HOSPITAL | Age: 44
End: 2019-10-23

## 2019-11-22 ENCOUNTER — OFFICE VISIT (OUTPATIENT)
Dept: FAMILY MEDICINE | Facility: OTHER | Age: 44
End: 2019-11-22
Attending: PHYSICIAN ASSISTANT
Payer: COMMERCIAL

## 2019-11-22 VITALS
DIASTOLIC BLOOD PRESSURE: 88 MMHG | WEIGHT: 285 LBS | HEART RATE: 72 BPM | SYSTOLIC BLOOD PRESSURE: 130 MMHG | BODY MASS INDEX: 40.89 KG/M2 | TEMPERATURE: 96.6 F

## 2019-11-22 DIAGNOSIS — Z01.818 PREOP GENERAL PHYSICAL EXAM: Primary | ICD-10-CM

## 2019-11-22 DIAGNOSIS — R73.09 ELEVATED GLUCOSE: ICD-10-CM

## 2019-11-22 LAB
EST. AVERAGE GLUCOSE BLD GHB EST-MCNC: 128 MG/DL
HBA1C MFR BLD: 6.1 % (ref 0–5.6)

## 2019-11-22 PROCEDURE — 99214 OFFICE O/P EST MOD 30 MIN: CPT | Mod: 25 | Performed by: PHYSICIAN ASSISTANT

## 2019-11-22 PROCEDURE — 36415 COLL VENOUS BLD VENIPUNCTURE: CPT | Performed by: PHYSICIAN ASSISTANT

## 2019-11-22 PROCEDURE — 93000 ELECTROCARDIOGRAM COMPLETE: CPT | Performed by: INTERNAL MEDICINE

## 2019-11-22 PROCEDURE — 83036 HEMOGLOBIN GLYCOSYLATED A1C: CPT | Performed by: PHYSICIAN ASSISTANT

## 2019-11-22 ASSESSMENT — PAIN SCALES - GENERAL: PAINLEVEL: NO PAIN (0)

## 2019-11-22 NOTE — NURSING NOTE
"Chief Complaint   Patient presents with     Pre-Op Exam       Initial /88   Pulse 72   Temp 96.6  F (35.9  C)   Wt 129.3 kg (285 lb)   BMI 40.89 kg/m   Estimated body mass index is 40.89 kg/m  as calculated from the following:    Height as of 1/29/18: 1.778 m (5' 10\").    Weight as of this encounter: 129.3 kg (285 lb).  Medication Reconciliation: complete  Jessa Behrman, LPN  "

## 2019-11-22 NOTE — PROGRESS NOTES
[facility] - [place]  [address]  [place] [address]  [phone]  Dept: [phone]    PRE-OP EVALUATION:  Today's date: 2019    Alberto Vieira (: 1975) presents for pre-operative evaluation assessment as requested by Dr. Garcia.  He requires evaluation and anesthesia risk assessment prior to undergoing surgery/procedure for treatment of left total hip arthroplasty direct anterior s/n .    Proposed Surgery/ Procedure: left total hip  Date of Surgery/ Procedure: 12-10-19  Time of Surgery/ Procedure: TBD  Hospital/Surgical [place] [facility] [place]  Primary Physician: Rody Lopez  Type of Anesthesia Anticipated: to be determined    Patient has a Health Care Directive or Living Will:  NO    1. NO - Do you have a history of heart attack, stroke, stent, bypass or surgery on an artery in the head, neck, heart or legs?  2. NO - Do you ever have any pain or discomfort in your chest?  3. NO - Do you have a history of  Heart Failure?  4. NO - Are you troubled by shortness of breath when: walking on the level, up a slight hill or at night?  5. NO - Do you currently have a cold, bronchitis or other respiratory infection?  6. NO - Do you have a cough, shortness of breath or wheezing?  7. NO - Do you sometimes get pains in the calves of your legs when you walk?  8. NO - Do you or anyone in your family have previous history of blood clots?  9. NO - Do you or does anyone in your family have a serious bleeding problem such as prolonged bleeding following surgeries or cuts?  10. NO - Have you ever had problems with anemia or been told to take iron pills?  11. NO - Have you had any abnormal blood loss such as black, tarry or bloody stools, or abnormal vaginal bleeding?  12. NO - Have you ever had a blood transfusion?  13. NO - Have you or any of your relatives ever had problems with anesthesia?  14. YES - DO YOU HAVE SLEEP APNEA, EXCESSIVE SNORING OR DAYTIME DROWSINESS? Pt has sleep apnea no CPAP  15. NO  - Do you have any prosthetic heart valves?  16. NO - Do you have prosthetic joints?  17. NO - Is there any chance that you may be pregnant?      HPI:     HPI related to upcoming procedure: has schedule hip replacement. This will be done on December 10 th and will be done by Dr. Garcia       See problem list for active medical problems.  Problems all longstanding and stable, except as noted/documented.  See ROS for pertinent symptoms related to these conditions.      MEDICAL HISTORY:     Patient Active Problem List    Diagnosis Date Noted     Elevated ALT measurement 10/15/2019     Priority: Medium     Hyperglycemia 10/15/2019     Priority: Medium     Lumbar disc herniation with radiculopathy 10/15/2019     Priority: Medium     AVN (avascular necrosis of bone) (H) 10/15/2019     Priority: Medium     Morbid obesity (H) 01/24/2018     Priority: Medium      History reviewed. No pertinent past medical history.  History reviewed. No pertinent surgical history.  Current Outpatient Medications   Medication Sig Dispense Refill     cyclobenzaprine (FLEXERIL) 10 MG tablet Take 1 tablet (10 mg) by mouth 3 times daily as needed for muscle spasms 30 tablet 1     naproxen (NAPROSYN) 500 MG tablet Take 1 tablet (500 mg) by mouth 3 times daily (with meals) (Patient not taking: Reported on 1/24/2018) 90 tablet 6     OTC products: None, except as noted above    No Known Allergies   Latex Allergy: NO    Social History     Tobacco Use     Smoking status: Former Smoker     Smokeless tobacco: Current User     Types: Chew   Substance Use Topics     Alcohol use: Yes     History   Drug Use No       REVIEW OF SYSTEMS:   CONSTITUTIONAL: NEGATIVE for fever, chills, change in weight  INTEGUMENTARY/SKIN: NEGATIVE for worrisome rashes, moles or lesions  EYES: NEGATIVE for vision changes or irritation  ENT/MOUTH: NEGATIVE for ear, mouth and throat problems  RESP: NEGATIVE for significant cough or SOB  BREAST: NEGATIVE for masses, tenderness or  discharge  CV: NEGATIVE for chest pain, palpitations or peripheral edema  GI: NEGATIVE for nausea, abdominal pain, heartburn, or change in bowel habits  : NEGATIVE for frequency, dysuria, or hematuria  MUSCULOSKELETAL:Positive :  Both hips have OA and his left hip has severe degermation and avascular necrosis.    NEURO: NEGATIVE for weakness, dizziness or paresthesias  ENDOCRINE: NEGATIVE for temperature intolerance, skin/hair changes  HEME: NEGATIVE for bleeding problems  PSYCHIATRIC: NEGATIVE for changes in mood or affect    EXAM:   /88   Pulse 72   Temp 96.6  F (35.9  C)   Wt 129.3 kg (285 lb)   BMI 40.89 kg/m      GENERAL APPEARANCE: healthy, alert and no distress     EYES: EOMI,  PERRL     HENT: ear canals and TM's normal and nose and mouth without ulcers or lesions     NECK: no adenopathy, no asymmetry, masses, or scars and thyroid normal to palpation     RESP: lungs clear to auscultation - no rales, rhonchi or wheezes     CV: regular rates and rhythm, normal S1 S2, no S3 or S4 and no murmur, click or rub     ABDOMEN:  soft, nontender, no HSM or masses and bowel sounds normal     MS: extremities normal- no gross deformities noted, no evidence of inflammation in joints, FROM in all extremities.     SKIN: no suspicious lesions or rashes     NEURO: Normal strength and tone, sensory exam grossly normal, mentation intact and speech normal     PSYCH: mentation appears normal. and affect normal/bright     LYMPHATICS: No cervical adenopathy    DIAGNOSTICS:       Recent Labs   Lab Test 10/15/19  1047 08/17/15  1147 07/31/15  1000   HGB 17.4  --   --      --   --      --  140   POTASSIUM 4.4  --  3.9   CR 1.18  --  1.19   A1C  --  5.7  --         IMPRESSION:   Reason for surgery/procedure: left hips replacement.   Diagnosis/reason for consult: medical clearance.     The proposed surgical procedure is considered INTERMEDIATE risk.    REVISED CARDIAC RISK INDEX  The patient has the following  serious cardiovascular risks for perioperative complications such as (MI, PE, VFib and 3  AV Block):  No serious cardiac risks  INTERPRETATION: 1 risks: Class II (low risk - 0.9% complication rate)    The patient has the following additional risks for perioperative complications:  No identified additional risks      ICD-10-CM    1. Preop general physical exam Z01.818        RECOMMENDATIONS:     --Consult hospital rounder / IM to assist post-op medical management    --Patient is on no chronic medications    APPROVAL GIVEN to proceed with proposed procedure, without further diagnostic evaluation       Signed Electronically by: SERENE Kendall    Copy of this evaluation report is provided to requesting physician.    [facility] Preop Guidelines    Revised Cardiac Risk Index

## 2019-11-23 ENCOUNTER — APPOINTMENT (OUTPATIENT)
Dept: LAB | Facility: OTHER | Age: 44
End: 2019-11-23
Attending: PHYSICIAN ASSISTANT
Payer: COMMERCIAL

## 2019-11-25 DIAGNOSIS — Z01.818 PREOP GENERAL PHYSICAL EXAM: ICD-10-CM

## 2019-11-25 LAB
ALBUMIN UR-MCNC: NEGATIVE MG/DL
APPEARANCE UR: CLEAR
BILIRUB UR QL STRIP: NEGATIVE
COLOR UR AUTO: ABNORMAL
GLUCOSE UR STRIP-MCNC: NEGATIVE MG/DL
HGB UR QL STRIP: NEGATIVE
KETONES UR STRIP-MCNC: NEGATIVE MG/DL
LEUKOCYTE ESTERASE UR QL STRIP: NEGATIVE
NITRATE UR QL: NEGATIVE
PH UR STRIP: 5 PH (ref 4.7–8)
SOURCE: ABNORMAL
SP GR UR STRIP: 1 (ref 1–1.03)
UROBILINOGEN UR STRIP-MCNC: NORMAL MG/DL (ref 0–2)

## 2019-11-25 PROCEDURE — 81003 URINALYSIS AUTO W/O SCOPE: CPT | Performed by: PHYSICIAN ASSISTANT

## 2019-12-03 NOTE — OR NURSING
Email sent to total joint replacement team at [facility] as follows;    We have Alberto Vieira : 77 coming 12/10 for LTHA with Dr. Garcia, PCP OLAF Lopez.  Alberto did not attend a total joint replacement class.  He plans on going to his home in [place] the day after surgery with help from significant other.  He does not have any equipment at home.  He lives in one story home, laundry in basement, three steps to enter home.  He has a cat.    Education provided regarding  Call don t Fall , Capnography Monitoring, and Signs & Symptoms of Infection to report/watch and prevent.  Alberto verbalized good understanding of all topics discussed.    Thank you,  Meahgan Saab R.N.  Pre-Anesthesia Testing Surgical Education  [facility]

## 2019-12-05 ENCOUNTER — ANESTHESIA EVENT (OUTPATIENT)
Dept: SURGERY | Facility: HOSPITAL | Age: 44
DRG: 470 | End: 2019-12-05
Payer: COMMERCIAL

## 2019-12-05 ASSESSMENT — LIFESTYLE VARIABLES: TOBACCO_USE: 1

## 2019-12-05 NOTE — ANESTHESIA PREPROCEDURE EVALUATION
Anesthesia Pre-Procedure Evaluation    Patient: Alberto Vieira   MRN: [id number] : 1975          Preoperative Diagnosis: * No pre-op diagnosis entered *    Procedure(s):  LEFT TOTAL HIP ARTHROPLASTY DIRECT ANTERIOR S/N    No past medical history on file.  History reviewed. No pertinent surgical history.    Anesthesia Evaluation     . Pt has not had prior anesthetic            ROS/MED HX    ENT/Pulmonary:     (+)sleep apnea, tobacco use, Past use doesn't use CPAP , . .    Neurologic:  - neg neurologic ROS     Cardiovascular:  - neg cardiovascular ROS   (+) ----. : . . . :. . Previous cardiac testing date:results:date: results:ECG reviewed date:19 results:sb date: results:          METS/Exercise Tolerance:  >4 METS   Hematologic:         Musculoskeletal:   (+) arthritis,  other musculoskeletal- lumbar disc herniation       GI/Hepatic:  - neg GI/hepatic ROS       Renal/Genitourinary:  - ROS Renal section negative       Endo:     (+) Obesity, .      Psychiatric:  - neg psychiatric ROS       Infectious Disease:  - neg infectious disease ROS       Malignancy:      - no malignancy   Other:    (+) C-spine cleared: N/A, no H/O Chronic Pain,no other significant disability                         Physical Exam  Normal systems: pulmonary    Airway   Mallampati: II  TM distance: >3 FB  Neck ROM: full    Dental   (+) loose    Cardiovascular   Rhythm and rate: irregular and normal      Pulmonary    breath sounds clear to auscultation            Lab Results   Component Value Date    WBC 5.0 10/15/2019    HGB 17.4 10/15/2019    HCT 50.9 10/15/2019     10/15/2019     10/15/2019    POTASSIUM 4.4 10/15/2019    CHLORIDE 101 10/15/2019    CO2 30 10/15/2019    BUN 14 10/15/2019    CR 1.18 10/15/2019     (H) 10/15/2019    SCOOTER 9.1 10/15/2019    ALBUMIN 4.3 10/15/2019    PROTTOTAL 8.3 10/15/2019     (H) 10/15/2019    AST 44 10/15/2019    ALKPHOS 67 10/15/2019    BILITOTAL 0.7 10/15/2019    TSH 2.11  "07/31/2015       Preop Vitals  BP Readings from Last 3 Encounters:   11/22/19 130/88   10/15/19 132/88   01/29/18 138/80    Pulse Readings from Last 3 Encounters:   11/22/19 72   10/15/19 67   01/29/18 72      Resp Readings from Last 3 Encounters:   10/15/19 20   07/10/15 20   06/17/15 16    SpO2 Readings from Last 3 Encounters:   10/15/19 96%   01/29/18 95%   01/24/18 96%      Temp Readings from Last 1 Encounters:   11/22/19 96.6  F (35.9  C)    Ht Readings from Last 1 Encounters:   01/29/18 1.778 m (5' 10\")      Wt Readings from Last 1 Encounters:   11/22/19 129.3 kg (285 lb)    Estimated body mass index is 40.89 kg/m  as calculated from the following:    Height as of 1/29/18: 1.778 m (5' 10\").    Weight as of 11/22/19: 129.3 kg (285 lb).       Anesthesia Plan      History & Physical Review  History and physical reviewed and following examination; no interval change.    ASA Status:  3 .        Plan for Spinal with Other induction. Maintenance will be TIVA.    PONV prophylaxis:  Ondansetron (or other 5HT-3) and Dexamethasone or Solumedrol  Hp 11/22/19      Postoperative Care  Postoperative pain management:  IV analgesics and Neuraxial analgesia.      Consents  Anesthetic plan, risks, benefits and alternatives discussed with:  Patient..                 ERINN Jones CRNA  "

## 2019-12-10 ENCOUNTER — ANESTHESIA (OUTPATIENT)
Dept: SURGERY | Facility: HOSPITAL | Age: 44
DRG: 470 | End: 2019-12-10
Payer: COMMERCIAL

## 2019-12-10 ENCOUNTER — APPOINTMENT (OUTPATIENT)
Dept: GENERAL RADIOLOGY | Facility: HOSPITAL | Age: 44
DRG: 470 | End: 2019-12-10
Attending: ORTHOPAEDIC SURGERY
Payer: COMMERCIAL

## 2019-12-10 ENCOUNTER — HOSPITAL ENCOUNTER (INPATIENT)
Facility: HOSPITAL | Age: 44
LOS: 1 days | Discharge: HOME | DRG: 470 | End: 2019-12-11
Attending: ORTHOPAEDIC SURGERY | Admitting: ORTHOPAEDIC SURGERY
Payer: COMMERCIAL

## 2019-12-10 ENCOUNTER — APPOINTMENT (OUTPATIENT)
Dept: PHYSICAL THERAPY | Facility: HOSPITAL | Age: 44
DRG: 470 | End: 2019-12-10
Attending: ORTHOPAEDIC SURGERY
Payer: COMMERCIAL

## 2019-12-10 DIAGNOSIS — Z96.642 STATUS POST LEFT HIP REPLACEMENT: Primary | ICD-10-CM

## 2019-12-10 PROCEDURE — 40000305 ZZH STATISTIC PRE PROC ASSESS I: Performed by: ORTHOPAEDIC SURGERY

## 2019-12-10 PROCEDURE — C9290 INJ, BUPIVACAINE LIPOSOME: HCPCS | Performed by: ORTHOPAEDIC SURGERY

## 2019-12-10 PROCEDURE — 88300 SURGICAL PATH GROSS: CPT | Mod: TC | Performed by: ORTHOPAEDIC SURGERY

## 2019-12-10 PROCEDURE — 25800030 ZZH RX IP 258 OP 636: Performed by: ORTHOPAEDIC SURGERY

## 2019-12-10 PROCEDURE — 25000125 ZZHC RX 250: Performed by: ORTHOPAEDIC SURGERY

## 2019-12-10 PROCEDURE — 40000985 XR PELVIS PORT 1/2 VW: Mod: TC

## 2019-12-10 PROCEDURE — 71000014 ZZH RECOVERY PHASE 1 LEVEL 2 FIRST HR: Performed by: ORTHOPAEDIC SURGERY

## 2019-12-10 PROCEDURE — 25000128 H RX IP 250 OP 636: Performed by: ORTHOPAEDIC SURGERY

## 2019-12-10 PROCEDURE — 27210794 ZZH OR GENERAL SUPPLY STERILE: Performed by: ORTHOPAEDIC SURGERY

## 2019-12-10 PROCEDURE — 36000063 ZZH SURGERY LEVEL 4 EA 15 ADDTL MIN: Performed by: ORTHOPAEDIC SURGERY

## 2019-12-10 PROCEDURE — C1776 JOINT DEVICE (IMPLANTABLE): HCPCS | Performed by: ORTHOPAEDIC SURGERY

## 2019-12-10 PROCEDURE — 0SRB06Z REPLACEMENT OF LEFT HIP JOINT WITH OXIDIZED ZIRCONIUM ON POLYETHYLENE SYNTHETIC SUBSTITUTE, OPEN APPROACH: ICD-10-PCS | Performed by: ORTHOPAEDIC SURGERY

## 2019-12-10 PROCEDURE — 25800030 ZZH RX IP 258 OP 636: Performed by: NURSE ANESTHETIST, CERTIFIED REGISTERED

## 2019-12-10 PROCEDURE — 27130 TOTAL HIP ARTHROPLASTY: CPT | Performed by: NURSE ANESTHETIST, CERTIFIED REGISTERED

## 2019-12-10 PROCEDURE — 27110028 ZZH OR GENERAL SUPPLY NON-STERILE: Performed by: ORTHOPAEDIC SURGERY

## 2019-12-10 PROCEDURE — 71000015 ZZH RECOVERY PHASE 1 LEVEL 2 EA ADDTL HR: Performed by: ORTHOPAEDIC SURGERY

## 2019-12-10 PROCEDURE — 37000009 ZZH ANESTHESIA TECHNICAL FEE, EACH ADDTL 15 MIN: Performed by: ORTHOPAEDIC SURGERY

## 2019-12-10 PROCEDURE — 25000125 ZZHC RX 250: Performed by: NURSE ANESTHETIST, CERTIFIED REGISTERED

## 2019-12-10 PROCEDURE — 40000277 XR SURGERY CARM FLUORO LESS THAN 5 MIN W STILLS: Mod: TC

## 2019-12-10 PROCEDURE — 25000132 ZZH RX MED GY IP 250 OP 250 PS 637: Performed by: INTERNAL MEDICINE

## 2019-12-10 PROCEDURE — 12000000 ZZH R&B MED SURG/OB

## 2019-12-10 PROCEDURE — 25000132 ZZH RX MED GY IP 250 OP 250 PS 637: Performed by: ORTHOPAEDIC SURGERY

## 2019-12-10 PROCEDURE — 36000065 ZZH SURGERY LEVEL 4 W FLUORO 1ST 30 MIN: Performed by: ORTHOPAEDIC SURGERY

## 2019-12-10 PROCEDURE — 37000008 ZZH ANESTHESIA TECHNICAL FEE, 1ST 30 MIN: Performed by: ORTHOPAEDIC SURGERY

## 2019-12-10 PROCEDURE — 25000128 H RX IP 250 OP 636: Performed by: NURSE ANESTHETIST, CERTIFIED REGISTERED

## 2019-12-10 PROCEDURE — 40000275 ZZH STATISTIC RCP TIME EA 10 MIN

## 2019-12-10 PROCEDURE — 97161 PT EVAL LOW COMPLEX 20 MIN: CPT | Mod: GP | Performed by: PHYSICAL THERAPIST

## 2019-12-10 DEVICE — IMPLANTABLE DEVICE: Type: IMPLANTABLE DEVICE | Site: HIP | Status: FUNCTIONAL

## 2019-12-10 DEVICE — THREADED HOLE COVER-REFLECTION: Type: IMPLANTABLE DEVICE | Site: HIP | Status: FUNCTIONAL

## 2019-12-10 DEVICE — SCREW-SPHERICAL HEAD CANCELLOUS 25MM: Type: IMPLANTABLE DEVICE | Site: HIP | Status: FUNCTIONAL

## 2019-12-10 RX ORDER — ACETAMINOPHEN 325 MG/1
975 TABLET ORAL EVERY 8 HOURS
Status: DISCONTINUED | OUTPATIENT
Start: 2019-12-10 | End: 2019-12-10

## 2019-12-10 RX ORDER — PROPOFOL 10 MG/ML
INJECTION, EMULSION INTRAVENOUS CONTINUOUS PRN
Status: DISCONTINUED | OUTPATIENT
Start: 2019-12-10 | End: 2019-12-10

## 2019-12-10 RX ORDER — BUPIVACAINE HYDROCHLORIDE 7.5 MG/ML
INJECTION, SOLUTION INTRASPINAL PRN
Status: DISCONTINUED | OUTPATIENT
Start: 2019-12-10 | End: 2019-12-10

## 2019-12-10 RX ORDER — SODIUM CHLORIDE, SODIUM LACTATE, POTASSIUM CHLORIDE, CALCIUM CHLORIDE 600; 310; 30; 20 MG/100ML; MG/100ML; MG/100ML; MG/100ML
INJECTION, SOLUTION INTRAVENOUS CONTINUOUS
Status: DISCONTINUED | OUTPATIENT
Start: 2019-12-10 | End: 2019-12-10 | Stop reason: HOSPADM

## 2019-12-10 RX ORDER — LIDOCAINE HYDROCHLORIDE 20 MG/ML
INJECTION, SOLUTION INFILTRATION; PERINEURAL PRN
Status: DISCONTINUED | OUTPATIENT
Start: 2019-12-10 | End: 2019-12-10

## 2019-12-10 RX ORDER — ONDANSETRON 4 MG/1
4 TABLET, ORALLY DISINTEGRATING ORAL EVERY 30 MIN PRN
Status: DISCONTINUED | OUTPATIENT
Start: 2019-12-10 | End: 2019-12-10 | Stop reason: HOSPADM

## 2019-12-10 RX ORDER — FENTANYL CITRATE 50 UG/ML
INJECTION, SOLUTION INTRAMUSCULAR; INTRAVENOUS PRN
Status: DISCONTINUED | OUTPATIENT
Start: 2019-12-10 | End: 2019-12-10

## 2019-12-10 RX ORDER — PHENYLEPHRINE HYDROCHLORIDE 10 MG/ML
INJECTION INTRAVENOUS PRN
Status: DISCONTINUED | OUTPATIENT
Start: 2019-12-10 | End: 2019-12-10

## 2019-12-10 RX ORDER — OXYCODONE HYDROCHLORIDE 5 MG/1
5-10 TABLET ORAL
Status: DISCONTINUED | OUTPATIENT
Start: 2019-12-10 | End: 2019-12-10 | Stop reason: ALTCHOICE

## 2019-12-10 RX ORDER — ONDANSETRON 2 MG/ML
4 INJECTION INTRAMUSCULAR; INTRAVENOUS EVERY 30 MIN PRN
Status: DISCONTINUED | OUTPATIENT
Start: 2019-12-10 | End: 2019-12-10 | Stop reason: HOSPADM

## 2019-12-10 RX ORDER — HYDROXYZINE HYDROCHLORIDE 25 MG/1
25 TABLET, FILM COATED ORAL EVERY 6 HOURS PRN
Status: DISCONTINUED | OUTPATIENT
Start: 2019-12-10 | End: 2019-12-11 | Stop reason: HOSPADM

## 2019-12-10 RX ORDER — ONDANSETRON 4 MG/1
4 TABLET, ORALLY DISINTEGRATING ORAL EVERY 6 HOURS PRN
Status: DISCONTINUED | OUTPATIENT
Start: 2019-12-10 | End: 2019-12-11 | Stop reason: HOSPADM

## 2019-12-10 RX ORDER — BISACODYL 10 MG
10 SUPPOSITORY, RECTAL RECTAL DAILY PRN
Status: DISCONTINUED | OUTPATIENT
Start: 2019-12-10 | End: 2019-12-11 | Stop reason: HOSPADM

## 2019-12-10 RX ORDER — LIDOCAINE 40 MG/G
CREAM TOPICAL
Status: DISCONTINUED | OUTPATIENT
Start: 2019-12-10 | End: 2019-12-10 | Stop reason: HOSPADM

## 2019-12-10 RX ORDER — HYDROMORPHONE HYDROCHLORIDE 1 MG/ML
.3-.5 INJECTION, SOLUTION INTRAMUSCULAR; INTRAVENOUS; SUBCUTANEOUS EVERY 5 MIN PRN
Status: DISCONTINUED | OUTPATIENT
Start: 2019-12-10 | End: 2019-12-10 | Stop reason: HOSPADM

## 2019-12-10 RX ORDER — ACETAMINOPHEN 325 MG/1
650 TABLET ORAL EVERY 4 HOURS PRN
Status: DISCONTINUED | OUTPATIENT
Start: 2019-12-13 | End: 2019-12-11 | Stop reason: HOSPADM

## 2019-12-10 RX ORDER — NALOXONE HYDROCHLORIDE 0.4 MG/ML
.1-.4 INJECTION, SOLUTION INTRAMUSCULAR; INTRAVENOUS; SUBCUTANEOUS
Status: DISCONTINUED | OUTPATIENT
Start: 2019-12-10 | End: 2019-12-11 | Stop reason: HOSPADM

## 2019-12-10 RX ORDER — ONDANSETRON 2 MG/ML
4 INJECTION INTRAMUSCULAR; INTRAVENOUS EVERY 6 HOURS PRN
Status: DISCONTINUED | OUTPATIENT
Start: 2019-12-10 | End: 2019-12-11 | Stop reason: HOSPADM

## 2019-12-10 RX ORDER — FENTANYL CITRATE 50 UG/ML
25-50 INJECTION, SOLUTION INTRAMUSCULAR; INTRAVENOUS
Status: DISCONTINUED | OUTPATIENT
Start: 2019-12-10 | End: 2019-12-10 | Stop reason: HOSPADM

## 2019-12-10 RX ORDER — HYDROMORPHONE HYDROCHLORIDE 1 MG/ML
0.2 INJECTION, SOLUTION INTRAMUSCULAR; INTRAVENOUS; SUBCUTANEOUS
Status: DISCONTINUED | OUTPATIENT
Start: 2019-12-10 | End: 2019-12-11 | Stop reason: HOSPADM

## 2019-12-10 RX ORDER — LIDOCAINE 40 MG/G
CREAM TOPICAL
Status: DISCONTINUED | OUTPATIENT
Start: 2019-12-10 | End: 2019-12-11 | Stop reason: HOSPADM

## 2019-12-10 RX ORDER — SODIUM CHLORIDE 9 MG/ML
INJECTION, SOLUTION INTRAVENOUS CONTINUOUS
Status: DISCONTINUED | OUTPATIENT
Start: 2019-12-10 | End: 2019-12-11 | Stop reason: HOSPADM

## 2019-12-10 RX ORDER — PROPOFOL 10 MG/ML
INJECTION, EMULSION INTRAVENOUS PRN
Status: DISCONTINUED | OUTPATIENT
Start: 2019-12-10 | End: 2019-12-10

## 2019-12-10 RX ORDER — BUPIVACAINE HYDROCHLORIDE AND EPINEPHRINE 2.5; 5 MG/ML; UG/ML
INJECTION, SOLUTION INFILTRATION; PERINEURAL PRN
Status: DISCONTINUED | OUTPATIENT
Start: 2019-12-10 | End: 2019-12-10 | Stop reason: HOSPADM

## 2019-12-10 RX ORDER — AMOXICILLIN 250 MG
2 CAPSULE ORAL 2 TIMES DAILY
Status: DISCONTINUED | OUTPATIENT
Start: 2019-12-10 | End: 2019-12-11 | Stop reason: HOSPADM

## 2019-12-10 RX ORDER — CYCLOBENZAPRINE HCL 10 MG
10 TABLET ORAL 3 TIMES DAILY PRN
Status: DISCONTINUED | OUTPATIENT
Start: 2019-12-10 | End: 2019-12-11 | Stop reason: HOSPADM

## 2019-12-10 RX ORDER — POLYETHYLENE GLYCOL 3350 17 G/17G
17 POWDER, FOR SOLUTION ORAL DAILY
Status: DISCONTINUED | OUTPATIENT
Start: 2019-12-10 | End: 2019-12-11 | Stop reason: HOSPADM

## 2019-12-10 RX ORDER — ONDANSETRON 2 MG/ML
INJECTION INTRAMUSCULAR; INTRAVENOUS PRN
Status: DISCONTINUED | OUTPATIENT
Start: 2019-12-10 | End: 2019-12-10

## 2019-12-10 RX ORDER — NALOXONE HYDROCHLORIDE 0.4 MG/ML
.1-.4 INJECTION, SOLUTION INTRAMUSCULAR; INTRAVENOUS; SUBCUTANEOUS
Status: ACTIVE | OUTPATIENT
Start: 2019-12-10 | End: 2019-12-11

## 2019-12-10 RX ORDER — HYDROCODONE BITARTRATE AND ACETAMINOPHEN 5; 325 MG/1; MG/1
1-2 TABLET ORAL EVERY 4 HOURS PRN
Status: DISCONTINUED | OUTPATIENT
Start: 2019-12-10 | End: 2019-12-11 | Stop reason: HOSPADM

## 2019-12-10 RX ADMIN — SODIUM CHLORIDE, POTASSIUM CHLORIDE, SODIUM LACTATE AND CALCIUM CHLORIDE: 600; 310; 30; 20 INJECTION, SOLUTION INTRAVENOUS at 08:27

## 2019-12-10 RX ADMIN — HYDROCODONE BITARTRATE AND ACETAMINOPHEN 1 TABLET: 5; 325 TABLET ORAL at 18:35

## 2019-12-10 RX ADMIN — MIDAZOLAM 2 MG: 1 INJECTION INTRAMUSCULAR; INTRAVENOUS at 08:50

## 2019-12-10 RX ADMIN — HYDROCODONE BITARTRATE AND ACETAMINOPHEN 1 TABLET: 5; 325 TABLET ORAL at 17:04

## 2019-12-10 RX ADMIN — SODIUM CHLORIDE: 9 INJECTION, SOLUTION INTRAVENOUS at 12:40

## 2019-12-10 RX ADMIN — CEFAZOLIN 3 G: 1 INJECTION, POWDER, FOR SOLUTION INTRAMUSCULAR; INTRAVENOUS at 08:51

## 2019-12-10 RX ADMIN — PHENYLEPHRINE HYDROCHLORIDE 100 MCG: 10 INJECTION INTRAVENOUS at 10:09

## 2019-12-10 RX ADMIN — SODIUM CHLORIDE, POTASSIUM CHLORIDE, SODIUM LACTATE AND CALCIUM CHLORIDE: 600; 310; 30; 20 INJECTION, SOLUTION INTRAVENOUS at 10:23

## 2019-12-10 RX ADMIN — PHENYLEPHRINE HYDROCHLORIDE 50 MCG: 10 INJECTION INTRAVENOUS at 09:20

## 2019-12-10 RX ADMIN — TRANEXAMIC ACID 1 G: 1 INJECTION, SOLUTION INTRAVENOUS at 10:11

## 2019-12-10 RX ADMIN — PROPOFOL 50 MG: 10 INJECTION, EMULSION INTRAVENOUS at 09:01

## 2019-12-10 RX ADMIN — PHENYLEPHRINE HYDROCHLORIDE 100 MCG: 10 INJECTION INTRAVENOUS at 10:22

## 2019-12-10 RX ADMIN — CEFAZOLIN 3 G: 1 INJECTION, POWDER, FOR SOLUTION INTRAMUSCULAR; INTRAVENOUS at 17:05

## 2019-12-10 RX ADMIN — BUPIVACAINE HYDROCHLORIDE IN DEXTROSE 2 ML: 7.5 INJECTION, SOLUTION SUBARACHNOID at 08:58

## 2019-12-10 RX ADMIN — LIDOCAINE HYDROCHLORIDE 40 MG: 20 INJECTION, SOLUTION INFILTRATION; PERINEURAL at 09:02

## 2019-12-10 RX ADMIN — PHENYLEPHRINE HYDROCHLORIDE 100 MCG: 10 INJECTION INTRAVENOUS at 09:44

## 2019-12-10 RX ADMIN — PROPOFOL 50 MG: 10 INJECTION, EMULSION INTRAVENOUS at 09:02

## 2019-12-10 RX ADMIN — PHENYLEPHRINE HYDROCHLORIDE 100 MCG: 10 INJECTION INTRAVENOUS at 10:03

## 2019-12-10 RX ADMIN — ONDANSETRON 4 MG: 2 INJECTION INTRAMUSCULAR; INTRAVENOUS at 10:27

## 2019-12-10 RX ADMIN — POLYETHYLENE GLYCOL 3350 17 G: 17 POWDER, FOR SOLUTION ORAL at 12:54

## 2019-12-10 RX ADMIN — PROPOFOL 70 MCG/KG/MIN: 10 INJECTION, EMULSION INTRAVENOUS at 09:03

## 2019-12-10 RX ADMIN — FENTANYL CITRATE 25 MCG: 50 INJECTION, SOLUTION INTRAMUSCULAR; INTRAVENOUS at 08:58

## 2019-12-10 RX ADMIN — TRANEXAMIC ACID 1 G: 1 INJECTION, SOLUTION INTRAVENOUS at 09:17

## 2019-12-10 RX ADMIN — ACETAMINOPHEN 975 MG: 325 TABLET, FILM COATED ORAL at 12:54

## 2019-12-10 RX ADMIN — SENNOSIDES AND DOCUSATE SODIUM 2 TABLET: 8.6; 5 TABLET ORAL at 20:58

## 2019-12-10 RX ADMIN — HYDROCODONE BITARTRATE AND ACETAMINOPHEN 2 TABLET: 5; 325 TABLET ORAL at 20:58

## 2019-12-10 RX ADMIN — PHENYLEPHRINE HYDROCHLORIDE 100 MCG: 10 INJECTION INTRAVENOUS at 09:55

## 2019-12-10 ASSESSMENT — ACTIVITIES OF DAILY LIVING (ADL)
ADLS_ACUITY_SCORE: 13
ADLS_ACUITY_SCORE: 17

## 2019-12-10 ASSESSMENT — MIFFLIN-ST. JEOR: SCORE: 2198.54

## 2019-12-10 NOTE — PLAN OF CARE
Discharge Planner PT   Patient plan for discharge: home  Current status: sup>sit SBA, sit>stand CGA, ambulated 200' with FWW CGA with step-through pattern, forward flexed with occasional cues needed to slow down for safety.  Barriers to return to prior living situation: impulsivity, stairs without rails to enter home  Recommendations for discharge: home vs home with outpatient PT  Rationale for recommendations: Pt s/p L ALFONSO.  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  Pt tolerated ambulation 200' with FWW CGA today.  Pt does demonstrate some impulsivity with mobility which could put him at risk for falls but is not fully receptive to education provided.  Recommend home vs home with outpatient PT pending his level of activity tolerance tomorrow.       Entered by: Cassie Mancia, PT 12/10/2019 4:17 PM

## 2019-12-10 NOTE — OP NOTE
Preoperative Diagnosis: Left Hip Osteoarthritis    Postoperative Diagnosis: Left Hip Osteoarthritis    Procedure: Left Direct Anterior Total Hip Arthroplasty    Surgeon: Carlos Garcia MD    Assistants: Micky SR    A skilled first assistant was required for patient positioning, retracting, and carrying out the procedure in a safe and effective manner    Anesthesia:   1) Spinal with Sedation  2) Local Injection around surgical site    Findings:    1) Satisfactory ALFONSO with near equal restoration of leg length and offset postoperatively    2) Adequate hemostasis     Implants:    1) [employer] Polar Femoral Stem Size 2 Standard Offset   2) Size 58 mm R3 Acetabular Shell   3) Size 36 mm standard poly acetabular insert   4) Size 36 mm +8 Oxinium Femoral Head      Estimated Blood Loss: 400 ml    Specimens: None    Drains: None    Complications: None    Indications:   This is a 44 year old patient with long standing Left hip pain and severe osteoarthritis.  They have failed nonoperative treatment including use of NSAIDs; Tylenol; injections and exercise.  Given the continued symptoms they wished to proceed with a hip replacement.  The risks including pain, bleeding, infection, deep vein thrombosis, pulmonary embolism, myocardial infarction, component failure, need for revision operation was discussed with the patient.  The surgical consent was signed and surgical site was marked.  All questions regarding postoperative disposition were answered.      Description of Procedure:   The patient was taken to the operating room and placed under spinal anesthesia.  They were then moved to the [place] bed and positioned in standard fashion.  The C-arm was used to make a templating radiograph for post reconstruction comparison.  The Left hip was prepped with a Chloraprep wipe and sponge then draped in sterile fashion.  A timeout was called to identify the correct patient and surgical site.  An anterolateral incision and  direct anterior approach to the hip was utilized.  Care was taken to cauterize the circumflex vessels.  A capsulotomy was completed and tag sutures were placed.  The femoral neck was exposed and an osteotomy was completed.  A secondary cut was made to make removing the femoral head/neck easier.  The hip was externally rotated and the labrum excised.  Further release on the proximal femur was completed to improve visualization of the proximal femur later in the case.  The C-arm was used during reaming the acetabulum to check alignment and depth of reaming.  The acetabulum was reamed to a size 58 mm and the acetabular shell was placed.  A 6.5 mm bone screw 25 mm in length was placed along with a hole eliminator.  The final 36 mm poly insert was placed and checked for a secure fit.  The traction was released and standard capsular releases were completed on the proximal femur.  The leg was externally rotated 120 degrees and brought into adduction and extension.  Standard retractor placement was utilized.  The femur was prepared with a box osteotome, canal finder and broaching up to a size 2.  The trial implants were placed and the hip was reduced.  C-arm was used to compare to preoperative leg length and offset.  The final size 2 Femoral stem was placed with a size 36 +8 Oxinium femoral head and the hip was reduced.  Final C-arm images were used to confirm positioning.  No fractures were identified.  Pulse lavage and betadine irrigation was used.  The capsule was closed with #1 Vicryl sutures.  The tensor fascia was closed with a #1 Vicryl suture in running fashion.  The subcutaneous tissue was closed with a 2-0 Vicryl and staples. An Aquacel dressing was applied.  The patient was awakened and transferred to PACU in stable condition.  A post operative x-ray was taken in PACU.        Postoperative Plan:   Routine ALFONSO protocol (Weightbearing as tolerated, No hip ROM precautions, PT, Pain control, DVT prophylaxis with  Aspirin).  Anticipate discharge in 1-2 days.  Follow-up in  2 weeks in [facility].  X-rays at follow-up AP and Cross Table Lateral Left Hip.

## 2019-12-10 NOTE — PROGRESS NOTES
[facility]    Hospitalist Progress Note      Assessment & Plan   Alberto Vieira is a 44 year old male who was admitted on 12/10/2019.         1.  Status post left total hip arthroplasty: Patient underwent left total hip replacement by Dr. Garcia today uncomplicated procedure.  Postoperatively he is doing well and no nausea is able to move his toes fine.  Minimal discomfort yet at this time.  Will be on aspirin for DVT prophylaxis.  Will likely be up with physical therapy later today.  His anticipated plans are to return home.    2.  Previous history hypertension: Previously been on medications.  Currently is on nothing.  Pressures fine.    3.  Lumbar disc herniation with radiculopathy: No issues with current pain.        Diet: Advance Diet as Tolerated: Regular Diet Adult  Fluids: IV lock    DVT Prophylaxis: Aspirin starting tomorrow  Code Status: Full Code    Disposition: Expected discharge in 1-2 days once ambulating safely.    Raymundo Toribio    Interval History   Seen postoperatively on 3 center.  Doing well.  No nausea vomiting no chest pain no shortness of breath.  Minimal discomfort at this time.  Is able to move his toes and foot without problem.  His plans are to go home.  Hopefully even tomorrow.  He does not request any pain medications yet.  He wants to come to see how it feels when he standing compared to preoperatively.  Told he does need to take some medications if he has significant incisional pain.    -Data reviewed today: I reviewed all new labs and imaging results over the last 24 hours. I personally reviewed no images or EKG's today.    Physical Exam   Temp: 98.6  F (37  C) Temp src: Tympanic BP: 125/80 Pulse: 73 Heart Rate: 79 Resp: 16 SpO2: 95 % O2 Device: None (Room air) Oxygen Delivery: 2 LPM  Vitals:    12/10/19 0822   Weight: 129.7 kg (286 lb)     Vital Signs with Ranges  Temp:  [96.9  F (36.1  C)-98.6  F (37  C)] 98.6  F (37  C)  Pulse:  [62-94] 73  Heart Rate:  [63-90]  79  Resp:  [12-18] 16  BP: ()/(54-80) 125/80  SpO2:  [94 %-100 %] 95 %  I/O last 3 completed shifts:  In: 1680 [P.O.:480; I.V.:1200]  Out: 200 [Urine:100; Blood:100]    Peripheral IV 12/10/19 Left Hand (Active)   Site Assessment WDL 12/10/2019 12:18 PM   Line Status Saline locked 12/10/2019 12:18 PM   Phlebitis Scale 0-->no symptoms 12/10/2019 12:18 PM   Infiltration Scale 0 12/10/2019 12:18 PM   Infiltration Site Treatment Method  None 12/10/2019 11:30 AM   Extravasation? No 12/10/2019 12:18 PM   Number of days: 0       Incision/Surgical Site 12/10/19 Left Hip (Active)   Incision Assessment [facility] 12/10/2019 12:42 PM   Betty-Incision Assessment [facility] 12/10/2019 12:42 PM   Closure ARMANDO 12/10/2019 12:42 PM   Incision Drainage Amount [facility] 12/10/2019 12:42 PM   Drainage Description [facility] 12/10/2019 12:42 PM   Dressing Intervention Clean, dry, intact 12/10/2019 12:42 PM   Number of days: 0     No line/device    Constitutional: Alert and oriented x3. No distress    HEENT: Normocephalic/atraumatic, PERRL, EOMI, mouth moist, neck supple, no LN.     Cardiovascular: RRR. no Murmur, no  rubs, or gallops.  JVD non.  Bruits no.  Pulses 3+    Respiratory: Clear to auscultation bilaterally    Abdomen: Soft, nontender, nondistended, no organomegaly. Bowel sounds present    Extremities: Warm/dry. No  Edema cms intact  Dressing intact    Neuro:   Non focal, cranial nerves intact, Moves all extremities.  Medications     sodium chloride 100 mL/hr at 12/10/19 1240       acetaminophen  975 mg Oral Q8H     [START ON 12/11/2019] aspirin  325 mg Oral BID     ceFAZolin  3 g Intravenous Q8H     polyethylene glycol  17 g Oral Daily     senna-docusate  2 tablet Oral BID     sodium chloride (PF)  3 mL Intracatheter Q8H       Data   No lab results found in last 7 days.    Recent Results (from the past 24 hour(s))   XR Surgery ROSI Fluoro L/T 5 Min w Stills    Narrative    XR SURGERY ROSI FLUORO LESS THAN 5 MIN W ANDIE 12/10/2019 10:32 AM      COMPARISON: None    HISTORY: Left Total Hip Arthroplasty    NUMBER OF IMAGES ACQUIRED: 3    VIEWS: 3 views demonstrate severe left hip degenerative change with  subsequent placement of the acetabular component in the intramedullary  reaming device.    FLUOROSCOPY TIME: .5 minute(s)      Impression    IMPRESSION: Intraoperative fluoroscopy was used for left hip  arthroplasty.    VIRIDIANA WYNN MD   XR Pelvis Port 1/2 Views    Narrative    PROCEDURE: XR PELVIS PORT 1/2 VW 12/10/2019 11:22 AM    HISTORY: Status Post Left ALFONSO    COMPARISONS: None.    TECHNIQUE: 1 view    FINDINGS: The pelvis is intact. There is a hip prosthesis in place on  the left. Prosthetic elements appear well seated. Right proximal femur  appears normal.         Impression    IMPRESSION: Left hip prosthesis in place    LORENZO WOOD MD

## 2019-12-10 NOTE — H&P
See recent pre-op note by Rody Lopez dated 11/22/19.    History and physical reviewed. Patient examined. No interval change in condition. Consent has been obtained.  Surgical site was marked. Patient's questions were answered.

## 2019-12-10 NOTE — PLAN OF CARE
Here for postop left hip replacement. Pt is A&O w/ VSS. Satting 95% on RA w/ no complaints of SOB or chest pain. Denies pain at this time. Educated pt on importance of pain management during stay. Pt stated he wanted to see how long he can go w/o needing prn pain meds dt receiving spinal block in surgery. Is able to feel touch to midthigh BLEs. Minimal numbness reported to toes & can somewhat feel buttocks. Able to do straight leg raises. Left hip dressing remains CDI w/ icepack in place. Administered scheduled tylenol. One episode of urinary incontinence w/ bedding changed. Ambulated w/ PT in hallway. Bed in lowest position. Call light in reach. Fall band in place. Very pleasant and cooperative and makes needs known. Will continue to monitor.    Face to face report given with opportunity to observe patient.    Report given to Radha Gambino   12/10/2019  3:15 PM

## 2019-12-10 NOTE — PROVIDER NOTIFICATION
Notified Dr. Toribio of pt reporting adverse effect from taking percocet (c/o HR racing). Request for different pain medication. Allergies updated.

## 2019-12-10 NOTE — ANESTHESIA CARE TRANSFER NOTE
Patient: Alberto Vieira    Procedure(s):  LEFT TOTAL HIP ARTHROPLASTY DIRECT ANTERIOR S/N    Diagnosis: * No pre-op diagnosis entered *  Diagnosis Additional Information: No value filed.    Anesthesia Type:   Spinal     Note:  Airway :Nasal Cannula  Patient transferred to:PACU  Handoff Report: Identifed the Patient, Identified the Reponsible Provider, Reviewed the pertinent medical history, Discussed the surgical course, Reviewed Intra-OP anesthesia mangement and issues during anesthesia, Set expectations for post-procedure period and Allowed opportunity for questions and acknowledgement of understanding      Vitals: (Last set prior to Anesthesia Care Transfer)    CRNA VITALS  12/10/2019 1022 - 12/10/2019 1059      12/10/2019             Resp Rate (set):  8                Electronically Signed By: ERINN Anderson CRNA  December 10, 2019  10:59 AM

## 2019-12-10 NOTE — ANESTHESIA POSTPROCEDURE EVALUATION
Patient: Alberto Vieira    Procedure(s):  LEFT TOTAL HIP ARTHROPLASTY DIRECT ANTERIOR S/N    Diagnosis:* No pre-op diagnosis entered *  Diagnosis Additional Information: No value filed.    Anesthesia Type:  Spinal    Note:  Anesthesia Post Evaluation    Patient participation: Able to participate in evaluation but full recovery from regional anesthesia has not yet ocurrred but is anticipated to occur within 48 hours  Level of consciousness: awake  Pain management: adequate  Airway patency: patent  Cardiovascular status: acceptable  Respiratory status: acceptable  Hydration status: acceptable  PONV: none     Anesthetic complications: None          Last vitals:  Vitals:    12/10/19 1145 12/10/19 1150 12/10/19 1155   BP: 115/72 120/79 111/71   Pulse: 62 67 63   Resp: 16 16 12   Temp:   97.5  F (36.4  C)   SpO2: 100% 100% 97%         Electronically Signed By: ERINN Anderson CRNA  December 10, 2019  12:01 PM

## 2019-12-10 NOTE — OR NURSING
Able to bend knees,left more than right.Denies pain.CMS to left foot good.Sensation to ankle on left and knee on rt.

## 2019-12-10 NOTE — PROGRESS NOTES
Inpatient Physical Therapy Evaluation    Name: Alberto Vieira MRN# [id number]   Age: 44 year old YOB: 1975     Date of Consultation: December 10, 2019  Consultation is requested by:  Dr. Garcia  Specific Consultation Request:  Post op total hip  Primary care provider: Rody Lopez    General Information:   Onset Date: 12/10/19    History of Current Problem/Admission: ARTHROPLASTY, HIP, TOTAL, DIRECT ANTERIOR APPROACH  Status post left hip replacement    Prior Level of Function: Pt reports he was independent with all ADLs and ambulating without assistive device prior to surgery.  Pt works at a semi-.    Ambulation: 0 - Independent   Transferrin - Independent   Toiletin - Independent    Bathin - Independent   Dressin - Independent   Eatin - Independent   Communication: 0 - Understands/communicates without difficulty  Swallowin - swallows foods/liquids without difficulty  Cognition: 0 - no cognitive issues reported    Fall history within the last 6 months: No    Current Living Situation: Patient has 4 steps into home without rail, reports mainfloor living inside.  Pt lives with roommates    Current Equipment Used at Home: none     Patient & Family Goals: to go home tomorrow     Past Medical History:   History reviewed. No pertinent past medical history.    Past Surgical History:  History reviewed. No pertinent surgical history.    Medications:   Current Facility-Administered Medications   Medication     [START ON 2019] acetaminophen (TYLENOL) tablet 650 mg     acetaminophen (TYLENOL) tablet 975 mg     [START ON 2019] aspirin (ASA) EC tablet 325 mg     benzocaine-menthol (CEPACOL) 15-3.6 MG lozenge 1-2 lozenge     bisacodyl (DULCOLAX) Suppository 10 mg     ceFAZolin (ANCEF) 3 g in sodium chloride 0.9 % 100 mL intermittent infusion     cyclobenzaprine (FLEXERIL) tablet 10 mg     HYDROmorphone (PF) (DILAUDID) injection 0.2 mg     hydrOXYzine (ATARAX)  tablet 25 mg     lidocaine (LMX4) kit     lidocaine 1 % 0.1-1 mL     naloxone (NARCAN) injection 0.1-0.4 mg     naloxone (NARCAN) injection 0.1-0.4 mg     ondansetron (ZOFRAN-ODT) ODT tab 4 mg    Or     ondansetron (ZOFRAN) injection 4 mg     oxyCODONE (ROXICODONE) tablet 5-10 mg     polyethylene glycol (MIRALAX/GLYCOLAX) Packet 17 g     senna-docusate (SENOKOT-S/PERICOLACE) 8.6-50 MG per tablet 2 tablet     sodium chloride (PF) 0.9% PF flush 3 mL     sodium chloride (PF) 0.9% PF flush 3 mL     sodium chloride 0.9% infusion       Weight Bearing Status: WBAT     Cognitive Status Examination:  Orientation: oriented to time, place and person  Level of Consciousness: alert  Follows Commands and Answers Questions: 75% of the time  Personal Safety and Judgement: Impulsive  Memory: Short-term memory intact and Long-term memory intact  Comments:     Pain:   Currently in pain? No  Pain Location?   Pain Rating:     Physical Therapy Evaluation:   Integumentary/Edema: covered incision left hip  ROM: ROM WFL for mobility  Strength: MMT deferred in L LE due to recent surgery, R LE strength WFL  Bed Mobility: mod I with use of trapeze  Transfers: sit<>stand CGA with cues for safe hand placement  Gait:  Ambulates 200' with FWW CGA, some forward posture with occasional cues to slow down.  Pt demonstrates a step-through pattern.   Stairs: NT  Balance: good with support from walker  Sensory: reports minimal numbness in LEs and buttocks  Coordination: NT    Physical Therapy Interventions: Bed Mobility, Gait Training , Neuro-muscular re-education, ROM, Strengthening, Stretching , Transfer Training, Risk Factor Education, Home Programming Guidelines and Progressive Activity/Exercise     Clinical Impressions:  Criteria for Skilled Therapeutic Intervention Met: Yes, treatment indicated  PT Diagnosis: gait disturbance s/p L ALFONSO  Influenced by the following impairments: impaired strength, impulsivity, impaired activity tolerance  Functional  limitations due to impairment: decreased tolerance for transfers, decreased tolerance for ambulation, decreased tolerance for stairs  Clinical presentation: Stable/Uncomplicated  Clinical presentation rationale: clinical judgement  Clinical Decision making (complexity): Low Complexity  Frequency: 2 times/day   Predicted Duration of Therapy Intervention (days/wks): 5 days  Anticipated Discharge Disposition: Home and Home with Outpatient Therapy   Anticipated Equipment Needs at Discharge: standard cane vs crutches vs front wheeled walker  Risks and Benefits of therapy have been explained: Yes  Patient, Family & other staff in agreement with plan of care: Yes  Clinical Impression Comments: Pt s/p L ALFONSO.  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  Pt tolerated ambulation 200' with FWW CGA today.  Pt does demonstrate some impulsivity with mobility which could put him at risk for falls but is not fully receptive to education provided.  Recommend home vs home with outpatient PT pending his level of activity tolerance tomorrow.  Total Eval Time: 21    G-Codes (Eval Only Medicare/[employer])

## 2019-12-10 NOTE — ANESTHESIA PROCEDURE NOTES
Peripheral nerve/Neuraxial procedure note : intrathecal  Pre-Procedure    Location: OR    Procedure Times:12/10/2019 8:54 AM and 12/10/2019 9:59 AM  Pre-Anesthestic Checklist: patient identified, IV checked, site marked, risks and benefits discussed, informed consent, monitors and equipment checked, pre-op evaluation, at physician/surgeon's request and post-op pain management    Timeout  Correct Patient: Yes   Correct Procedure: Yes   Correct Site: Yes   Correct Laterality: Yes   Correct Position: Yes   Site Marked: Yes   .   Procedure Documentation    .    Procedure: intrathecal, .   Patient Position:sitting Insertion Site:L4-5  (midline approach)     Patient Prep/Sterile Barriers; mask, sterile gloves, chlorhexidine gluconate and isopropyl alcohol, patient draped.  .  Needle:  Spinal Needle (gauge): 24  Spinal/LP Needle Length (inches): 3.5 # of attempts: 1 and  # of redirects:  2 Introducer used .        Assessment/Narrative  Paresthesias: Resolved.  .  .  blood tinged CSF fluid removed . Time Injected: 08:59

## 2019-12-10 NOTE — PLAN OF CARE
[facility] Inpatient Admission Note:    Patient admitted to 3224/3224-1 at approximately 12:15 PM via cart accompanied by nurse from surgery . Report received from Lata in SBAR format at 12:15 PM via face to face in room. Patient transferred to bed via slide board.. Patient is alert and oriented X 3, denies pain. Patient oriented to room, unit, hourly rounding, and plan of care. Explained admission packet and patient handbook with patient bill of rights brochure. Will continue to monitor and document as needed.     Inpatient Nursing criteria listed below was met:    Health care directives status obtained and documented: No    Care Everywhere authorization obtained No    MRSA swab completed for patient 65 years and older: N/A    Patient identifies a surrogate decision maker: Yes If yes, who: Ramón Contact Information: See flowsheet     If initial lactic acid >2.0, repeat lactic acid drawn within one hour of arrival to unit: NA. If no, state reason: N/A    Vaccination assessment and education completed: Yes  Vaccinations received prior to admission: Pneumovax no    Influenza(seasonal)  No  Vaccination(s) ordered: patient declines    Clergy visit ordered if patient requests: N/A    Skin issues/needs documented: Yes    Isolation Patient: no Education given, correct sign in place and documentation row added to PCS:  Yes    Fall Prevention Yes: Care plan updated, education given and documented, sticker and magnet in place: Yes    Care Plan initiated: Yes    Education Documented (including assessment): Yes    Patient has discharge needs : Yes If yes, please explain: PT

## 2019-12-11 ENCOUNTER — APPOINTMENT (OUTPATIENT)
Dept: PHYSICAL THERAPY | Facility: HOSPITAL | Age: 44
DRG: 470 | End: 2019-12-11
Attending: ORTHOPAEDIC SURGERY
Payer: COMMERCIAL

## 2019-12-11 VITALS
HEART RATE: 81 BPM | SYSTOLIC BLOOD PRESSURE: 151 MMHG | WEIGHT: 286 LBS | TEMPERATURE: 99.6 F | BODY MASS INDEX: 40.94 KG/M2 | DIASTOLIC BLOOD PRESSURE: 93 MMHG | RESPIRATION RATE: 16 BRPM | HEIGHT: 70 IN | OXYGEN SATURATION: 94 %

## 2019-12-11 LAB
COPATH REPORT: NORMAL
GLUCOSE SERPL-MCNC: 122 MG/DL (ref 70–99)
HGB BLD-MCNC: 13.6 G/DL (ref 13.3–17.7)

## 2019-12-11 PROCEDURE — 25000132 ZZH RX MED GY IP 250 OP 250 PS 637: Performed by: ORTHOPAEDIC SURGERY

## 2019-12-11 PROCEDURE — 25000132 ZZH RX MED GY IP 250 OP 250 PS 637: Performed by: INTERNAL MEDICINE

## 2019-12-11 PROCEDURE — 82947 ASSAY GLUCOSE BLOOD QUANT: CPT | Performed by: ORTHOPAEDIC SURGERY

## 2019-12-11 PROCEDURE — 40000275 ZZH STATISTIC RCP TIME EA 10 MIN

## 2019-12-11 PROCEDURE — 25000128 H RX IP 250 OP 636: Performed by: ORTHOPAEDIC SURGERY

## 2019-12-11 PROCEDURE — 25800030 ZZH RX IP 258 OP 636: Performed by: ORTHOPAEDIC SURGERY

## 2019-12-11 PROCEDURE — 97530 THERAPEUTIC ACTIVITIES: CPT | Mod: GP | Performed by: PHYSICAL THERAPIST

## 2019-12-11 PROCEDURE — 85018 HEMOGLOBIN: CPT | Performed by: ORTHOPAEDIC SURGERY

## 2019-12-11 PROCEDURE — 36415 COLL VENOUS BLD VENIPUNCTURE: CPT | Performed by: ORTHOPAEDIC SURGERY

## 2019-12-11 RX ORDER — ASPIRIN 325 MG
325 TABLET, DELAYED RELEASE (ENTERIC COATED) ORAL
Qty: 60 TABLET | Refills: 0 | Status: SHIPPED | OUTPATIENT
Start: 2019-12-11 | End: 2020-07-27

## 2019-12-11 RX ORDER — HYDROCODONE BITARTRATE AND ACETAMINOPHEN 5; 325 MG/1; MG/1
1-2 TABLET ORAL EVERY 4 HOURS PRN
Qty: 40 TABLET | Refills: 0 | Status: SHIPPED | OUTPATIENT
Start: 2019-12-11 | End: 2019-12-19

## 2019-12-11 RX ADMIN — HYDROCODONE BITARTRATE AND ACETAMINOPHEN 2 TABLET: 5; 325 TABLET ORAL at 09:59

## 2019-12-11 RX ADMIN — SENNOSIDES AND DOCUSATE SODIUM 2 TABLET: 8.6; 5 TABLET ORAL at 09:53

## 2019-12-11 RX ADMIN — POLYETHYLENE GLYCOL 3350 17 G: 17 POWDER, FOR SOLUTION ORAL at 09:53

## 2019-12-11 RX ADMIN — CEFAZOLIN 3 G: 1 INJECTION, POWDER, FOR SOLUTION INTRAMUSCULAR; INTRAVENOUS at 01:11

## 2019-12-11 RX ADMIN — HYDROCODONE BITARTRATE AND ACETAMINOPHEN 2 TABLET: 5; 325 TABLET ORAL at 01:11

## 2019-12-11 RX ADMIN — HYDROCODONE BITARTRATE AND ACETAMINOPHEN 1 TABLET: 5; 325 TABLET ORAL at 05:10

## 2019-12-11 RX ADMIN — ASPIRIN 325 MG: 325 TABLET, DELAYED RELEASE ORAL at 09:53

## 2019-12-11 ASSESSMENT — ACTIVITIES OF DAILY LIVING (ADL)
ADLS_ACUITY_SCORE: 13

## 2019-12-11 NOTE — PLAN OF CARE
Face to face report given with opportunity to observe patient.    Report given to JOSÉ Guzman.    Radha lLoyd RN   12/10/2019  11:23 PM

## 2019-12-11 NOTE — PLAN OF CARE
Discharge Planner PT   Patient plan for discharge: Home  Current status: Patient performed sit to stand transfer with use of crutches IND.Instructed in appropriate gait pattern with B axillary crutches using a step-to pattern. Ambulated 150ft with CGA_SBA safely. Performed 5 stairs with B axillary crutches with appropriate mechanics. Patient was properly fitted with crutches and crutches dispensed. HEP reviewed with patient and he has no questions/concerns.  Barriers to return to prior living situation: None  Recommendations for discharge: Home  Rationale for recommendations: Due to current functional status       Entered by: Kate Saab 12/11/2019 9:14 AM

## 2019-12-11 NOTE — PLAN OF CARE
"/81   Pulse 94   Temp 98.9  F (37.2  C) (Tympanic)   Resp 18   Ht 1.778 m (5' 10\")   Wt 129.7 kg (286 lb)   SpO2 94%   BMI 41.04 kg/m   VSS. Pt afebrile. Consistently c/o pain rated 5-6/10. Pt stated he had an adverse reaction to percocet (heart racing). MD and pharmacy consulted. New orders entered for norco. Writer attempted to manage pt's pain with ice packs, repositioning, and administering 1 norco, but increased to 2. Pt stated he felt a little relief (pain went to a 4-5). 2 Norco administered again at 2058.    Lungs: clear. IS use encouraged. Abd: soft and nontender. Active bowel sounds. Diet: Regular diet tolerated without difficulty. : Pt voiding without difficulty and continent of urine now that nerve block has worn off. Pt wore SCDs but declined to wear them after a few hours. L hip dressing: clean/dry/intact. No shadowing. CMS: pedal pulses 2+. Pt has sensation in L foot. Cap refill <3 seconds. Pt SBA with walker; weight-bearing as tolerated.    ABX: Ancef q8h. PIV: infusing NS at TKO. To be saline-locked following final ancef administration. Call light within reach.  "

## 2019-12-11 NOTE — PROGRESS NOTES
Assessment completed see flowsheet.    LOC: alert   Others present: Patient and significant other    Dx: LTHA  Chronic Disease Management: No    Lives with: alone  Living at:  Home in [place]  Transportation: YES, drives self, family will provide transportation on discharge    Primary PCP: Rody Lopez  Insurance:  [employer]  Medicare: No letter reviewed.    Support System:  Mom and dad  Homecare/PCA: no  /County Services:   no  : NO      How was the [facility] notification completed: n/a    Health Care Directive: NO   Guardian: no  POA: no    Pharmacy: [facility]  Meds management:  Manages own    Adequate Resources for needs (housing, utilities, food/med): YES  Household chores: only rents a room, other roomates keep up the house and manage the outside chores  Work/community/social activity: YES, works for [employer], enjoys fishing, hunting and drinking with friends.    ADLs: independent  Ambulation:independent  Falls: no  Nutrition: no concern  Sleep: sleeps well    Equipment used: none, will use crutches on discharge      Oxygen supplier: no      Does the supplier have valid oxygen orders: n/a    Mental health: no  Substance abuse: yes consumes daily, 4-5 during the week and a case on weekends.   Exposure to violence/abuse: no  Stressors: denies    Able to Return to Prior Living Arrangements: YES    Choice of Vendor: n/a    Barriers: none    MAGDIEL: low    Plan: home with significant other            Name: Alberto Vieira    MRN#: [id number]    Reason for Hospitalization: ARTHROPLASTY, HIP, TOTAL, DIRECT ANTERIOR APPROACH  Status post left hip replacement    Discharge Date: 12/11/2019    Patient / Family response to discharge plan: in agreement    Follow-Up Appt: No future appointments.    Other Providers (Care Coordinator, County Services, PCA services etc): No    Discharge Disposition: home with assistance of his significant other.    Rody Ward CM

## 2019-12-11 NOTE — PLAN OF CARE
Pt VSS, on aspirin for anticoagulant.  CMS intact.  Walked in [place] with PT use of crutches, stable on feet.  Aquacele in place, CDI.  Pain managed with 2 tabs Hydrocodone.  Ice PRN.  Call light in reach.     Patient discharged at 1:00 PM via wheel chair accompanied by significant other and staff. Prescriptions sent with patient to fill . All belongings sent with patient.     Discharge instructions reviewed with pt. Listed belongings gathered and returned to patient. yes    Patient discharged to home.   Report called to na    Core Measures and Vaccines  Core Measures applicable during stay: Yes. If yes, state diagnosis: hip replace  Pneumonia and Influenza given prior to discharge, if indicated: N/A    Surgical Patient   Surgical Procedures during stay: yes  Did patient receive discharge instruction on wound care and recognition of infection symptoms? Yes    MISC  Follow up appointment made:  Yes  Home and hospital aquired medications returned to patient: N/A  Patient reports pain was well managed at discharge: Yes

## 2019-12-11 NOTE — DISCHARGE INSTRUCTIONS
Appointments: *You have a Hospital Follow-up scheduled for Thursday December 26th at 9:00 am with Dr. Purcell at [facility] if you need to reschedule please call [phone]

## 2019-12-11 NOTE — PLAN OF CARE
"Reason for hospital stay:  Status post left total hip arthroplasty:    Most recent vitals: /99   Pulse 86   Temp 99.9  F (37.7  C) (Tympanic)   Resp 18   Ht 1.778 m (5' 10\")   Wt 129.7 kg (286 lb)   SpO2 96%   BMI 41.04 kg/m   Blood pressure elevated during the night night 154/99 and 169/90. Max temp of 99.9.   Pain Management:  Patient had Incisional pain 5/10 that was relieved with 2 Norco in the beginning of the shift and 2 Norco in the second half of the shift. Ice placed on left hip most of the night.   Orientation:  Pt awake, alert and oriented. Makes needs known. Slept a couple hour stretch during the night.   Cardiac:  Apical pulse is regular. Rate is in the 80's-90's.   Respiratory:  Lungs are clear and sats on room air is in the high 90's.   GI:  Bowel sounds are active and pt states passing some flatus.   :  Up walking with walker to bathroom voiding without difficulty.   Diet: Encouraged fluids and pt states tolerating regular diet without difficulty.   Skin Issues:  Aquacell dressing on left hip is CDI without any shadowing. CMS on left foot is intact and pedal pulses are palpable.   IVF:  IV is saline locked except for ancef during the night.   ABX:  Ancef hung this shift.   Mobility:  Weight bearing as tolerated. Up with assist of 1 to stand by assist with walker. Steady on his feet. Pain is very increased with movement per pt. Able to walk to the bathroom without any problems. Slow and steady.   Safety:  Call light within reach and uses with any needs. ID band on, wheels on bed locked and bed alarm has been off but pt uses call light appropriately.     Comments: Went into the room at 0445 and pt was shivering. Temp was normal at 98.9. Warm blanket given and pt was ok within 5 minutes.     12/11/2019  0551  Sonal Bartholomew RN    "

## 2019-12-11 NOTE — PROGRESS NOTES
Subjective: The patient is POD #1 s/p left Total Hip Arthroplasty.  The patients pain is controlled fairly well.  They deny shortness of breath, chest pain, nausea or vomiting.  There have been no acute perioperative complications    Objective:   B/P: 169/90, Temp: 98.9, Pulse: 86, Resp: 18    Alert and Orientated x3; No Acute Distress; Appears comfortable     Hip Exam: dressing/wound is clean, dry, intact without significant drainage.  No erythema or signs of infection.     No evidence of DVT    Distal motor/sensory exam is intact in the superficial peroneal, deep peroneal and tibial nerve distributions.      Normal capillary refill with a palpable dorsalis pedis pulse.    Hemoglobin   Date Value Ref Range Status   12/11/2019 13.6 13.3 - 17.7 g/dL Final       Assessment/Plan:     1) POD # 1 S/P left Total Hip Arthroplasty  -Pain controlled  -PT/OT--- Strengthening and Gait training  -WBAT; No Hip ROM Precautions  -DVT prophylaxis  -Dispo--To home when cleared Medically and by PT.   -Follow-Up in [facility] in 2 weeks  -Hospitalist co-management

## 2019-12-12 ENCOUNTER — TELEPHONE (OUTPATIENT)
Dept: CASE MANAGEMENT | Facility: HOSPITAL | Age: 44
End: 2019-12-12

## 2019-12-14 NOTE — DISCHARGE SUMMARY
[facility] Discharge Summary    Alberto Vieira MRN# [id number]   Age: 44 year old YOB: 1975     Date of Admission:  12/10/2019  Date of Discharge::  12/11/2019  1:00 PM  Admitting Physician:  Carlos Garcia MD  Discharge Physician:  Ebenezer Alexander PA-C             Admission Diagnoses:   ARTHROPLASTY, HIP, TOTAL, DIRECT ANTERIOR APPROACH  Status post left hip replacement  Left hip arthritis          Discharge Diagnosis:   Left hip arthritis          Procedures:   Procedure(s): Left total hip arthroplasty       No other procedures performed during this admission           Medications Prior to Admission:     No medications prior to admission.             Discharge Medications:     Discharge Medication List as of 12/11/2019 11:30 AM      START taking these medications    Details   aspirin (ASA) 325 MG EC tablet Take 1 tablet (325 mg) by mouth 2 times daily, Disp-60 tablet, R-0, Local Print      HYDROcodone-acetaminophen (NORCO) 5-325 MG tablet Take 1-2 tablets by mouth every 4 hours as needed for moderate to severe pain, Disp-40 tablet, R-0, Local Print         CONTINUE these medications which have NOT CHANGED    Details   cyclobenzaprine (FLEXERIL) 10 MG tablet Take 1 tablet (10 mg) by mouth 3 times daily as needed for muscle spasms, Disp-30 tablet, R-1, E-Prescribe         STOP taking these medications       naproxen (NAPROSYN) 500 MG tablet Comments:   Reason for Stopping:                     Consultations:   Consultation during this admission received from internal medicine.  No medical intervention was indicated.            Brief History of Illness:   Reason for admission requiring a surgical or invasive procedure:   Left hip arthritis   The patient underwent the following procedure(s):   Left total hip arthroplasty   There were no immediate complications during this procedure.    Please refer to the full operative summary for details.           Hospital Course:   The patient's hospital  course was unremarkable.  He recovered as anticipated and experienced no post-operative complications. He discharge home the following day in good condition.          Discharge Instructions and Follow-Up:   Discharge diet: Regular   Discharge activity: Activity as tolerated   Discharge follow-up: Follow up with me in 2 weeks   Wound care: Keep dressing in place until followup           Discharge Disposition:   Discharged to home        Ebenezer Alexander PA-C

## 2019-12-19 ENCOUNTER — HOSPITAL ENCOUNTER (EMERGENCY)
Facility: HOSPITAL | Age: 44
Discharge: HOME | End: 2019-12-19
Attending: NURSE PRACTITIONER | Admitting: NURSE PRACTITIONER
Payer: COMMERCIAL

## 2019-12-19 VITALS
DIASTOLIC BLOOD PRESSURE: 100 MMHG | SYSTOLIC BLOOD PRESSURE: 137 MMHG | TEMPERATURE: 97.6 F | BODY MASS INDEX: 40.18 KG/M2 | OXYGEN SATURATION: 96 % | WEIGHT: 280 LBS | RESPIRATION RATE: 16 BRPM

## 2019-12-19 DIAGNOSIS — G89.18 POSTOPERATIVE PAIN: ICD-10-CM

## 2019-12-19 PROCEDURE — G0463 HOSPITAL OUTPT CLINIC VISIT: HCPCS

## 2019-12-19 PROCEDURE — 99213 OFFICE O/P EST LOW 20 MIN: CPT | Mod: Z6 | Performed by: NURSE PRACTITIONER

## 2019-12-19 RX ORDER — HYDROCODONE BITARTRATE AND ACETAMINOPHEN 5; 325 MG/1; MG/1
2 TABLET ORAL
Qty: 18 TABLET | Refills: 0 | Status: SHIPPED | OUTPATIENT
Start: 2019-12-19 | End: 2020-07-27

## 2019-12-19 ASSESSMENT — ENCOUNTER SYMPTOMS
GASTROINTESTINAL NEGATIVE: 1
FEVER: 0
EYES NEGATIVE: 1
ENDOCRINE NEGATIVE: 1
NEUROLOGICAL NEGATIVE: 1
PSYCHIATRIC NEGATIVE: 1
HEMATOLOGIC/LYMPHATIC NEGATIVE: 1
CARDIOVASCULAR NEGATIVE: 1
RESPIRATORY NEGATIVE: 1
ALLERGIC/IMMUNOLOGIC NEGATIVE: 1

## 2019-12-19 NOTE — ED TRIAGE NOTES
"Pt states he is having post op pain from left hip replacement 12/09/2019. States the pain has been bad at night and he is out of pain meds. States the surgery office is mailing him a script for more pain meds \"but that could take a week and I am having pain now\". States incision looks normal with no redness. Denies any fevers or body aches.  "

## 2019-12-19 NOTE — ED TRIAGE NOTES
Pt presents today with c/o pain left hip and radiated to left foot. States he had hip replacement, was placed on hydrocodone. States his pain is worse, and requesting refill on pain medications. States he is currently taking flexeril. Tried to contact surgeon, Dr. Garcia, will mail it up here, but needs something to last until they come up.

## 2019-12-19 NOTE — ED AVS SNAPSHOT
[facility]  [address]  [place] [address]  Phone:  [phone]                                    Alberto Vieira   MRN: [id number]    Department:  [facility]   Date of Visit:  12/19/2019           After Visit Summary Signature Page    I have received my discharge instructions, and my questions have been answered. I have discussed any challenges I see with this plan with the nurse or doctor.    ..........................................................................................................................................  Patient/Patient Representative Signature      ..........................................................................................................................................  Patient Representative Print Name and Relationship to Patient    ..................................................               ................................................  Date                                   Time    ..........................................................................................................................................  Reviewed by Signature/Title    ...................................................              ..............................................  Date                                               Time          22EPIC Rev 08/18

## 2019-12-19 NOTE — ED PROVIDER NOTES
History     Chief Complaint   Patient presents with     Post-op Problem     The history is provided by the patient and the spouse.     Alberto Vieira is a 44 year old male who presents to the . He states he is out of his pain medication from surgery.  He had left hip replacement on 12/10/2019 and discharge home on 12/11/2019, by Dr dill.  He was discharged with 40 norco (reviewed ).  He has a follow up with Dr Purcell on 12/26.  No signs of infection and is not draining through the dressing. No redness noted around the dressing. Dressing is to remain in place until follow up.  Alberto states he is having a hard time sleeping due to increased pain with laying or sitting. He is trying to only take the norco at night to help him sleep.  During the day he is able to control the pain with change in position.     Allergies:  Allergies   Allergen Reactions     Percocet [Oxycodone-Acetaminophen] Palpitations     Adverse reaction       Problem List:    Patient Active Problem List    Diagnosis Date Noted     Status post total hip replacement, left 12/10/2019     Priority: Medium     Status post left hip replacement 12/10/2019     Priority: Medium     Elevated ALT measurement 10/15/2019     Priority: Medium     Hyperglycemia 10/15/2019     Priority: Medium     Lumbar disc herniation with radiculopathy 10/15/2019     Priority: Medium     AVN (avascular necrosis of bone) (H) 10/15/2019     Priority: Medium     Morbid obesity (H) 01/24/2018     Priority: Medium        Past Medical History:    No past medical history on file.    Past Surgical History:    Past Surgical History:   Procedure Laterality Date     ARTHROPLASTY HIP ANTERIOR Left 12/10/2019    Procedure: LEFT TOTAL HIP ARTHROPLASTY DIRECT ANTERIOR S/N;  Surgeon: Carlos Garcia MD;  Location: [facility]       Family History:    Family History   Problem Relation Age of Onset     Heart Disease Father      Cerebrovascular Disease Maternal Grandfather        Social  History:  Marital Status:  Single [1]  Social History     Tobacco Use     Smoking status: Former Smoker     Smokeless tobacco: Current User     Types: Chew   Substance Use Topics     Alcohol use: Yes     Drug use: No        Medications:    aspirin (ASA) 325 MG EC tablet  cyclobenzaprine (FLEXERIL) 10 MG tablet  HYDROcodone-acetaminophen (NORCO) 5-325 MG tablet          Review of Systems   Constitutional: Negative for fever.   HENT: Negative.    Eyes: Negative.    Respiratory: Negative.    Cardiovascular: Negative.    Gastrointestinal: Negative.    Endocrine: Negative.    Genitourinary: Negative.    Musculoskeletal:        Left hip pain surgical area   Skin:        Dressing intact to left hip. Spotting on dressing, but nothing coming through the dressing   Allergic/Immunologic: Negative.    Neurological: Negative.    Hematological: Negative.    Psychiatric/Behavioral: Negative.        Physical Exam   BP: 137/100  Heart Rate: 100  Temp: 97.6  F (36.4  C)  Resp: 16  Weight: 127 kg (280 lb)  SpO2: 96 %      Physical Exam  Vitals signs and nursing note reviewed.   Cardiovascular:      Rate and Rhythm: Normal rate.      Heart sounds: Normal heart sounds.   Pulmonary:      Effort: Pulmonary effort is normal. No respiratory distress.      Breath sounds: Normal breath sounds.   Musculoskeletal:      Comments: Left hip pain using crutches to ambulate   Skin:     General: Skin is warm.      Capillary Refill: Capillary refill takes less than 2 seconds.      Comments: Dressing intact to left hip   Neurological:      General: No focal deficit present.      Mental Status: He is alert and oriented to person, place, and time.   Psychiatric:         Mood and Affect: Mood normal.         Behavior: Behavior normal.         ED Course        Procedures               Critical Care time:  none               No results found for this or any previous visit (from the past 24 hour(s)).    Medications - No data to display    Assessments & Plan  (with Medical Decision Making)     I have reviewed the nursing notes.    I have reviewed the findings, diagnosis, plan and need for follow up with the patient.      Patient verbally educated and given appropriate education sheets for the diagnoses and has no questions.  Take medications as directed.   Follow up with your Primary Care provider if symptoms increase or if further concerns develop, return to the ER    With continued pain. Alberto states he continues to need pain medication at night when he lays down. Plan to send new prescription for norco 2 tablets at night as needed for pain.  He should receive further pain  Medication from his surgeon.  Alberto agrees with plan today.     Can use 2 norco at night to help with pain   Keep follow up appointment with [facility].   reviewed today     New Prescriptions    HYDROCODONE-ACETAMINOPHEN (NORCO) 5-325 MG TABLET    Take 2 tablets by mouth nightly as needed for pain       Final diagnoses:   Postoperative pain       12/19/2019   [facility]     Kirti Zayas APRN CNP  12/19/19 [id number]

## 2019-12-19 NOTE — DISCHARGE INSTRUCTIONS
Can use 2 norco at night to help with pain   Keep follow up appointment with [facility].

## 2019-12-23 ENCOUNTER — TRANSFERRED RECORDS (OUTPATIENT)
Dept: HEALTH INFORMATION MANAGEMENT | Facility: HOSPITAL | Age: 44
End: 2019-12-23

## 2020-01-22 ENCOUNTER — TRANSFERRED RECORDS (OUTPATIENT)
Dept: HEALTH INFORMATION MANAGEMENT | Facility: CLINIC | Age: 45
End: 2020-01-22

## 2020-07-26 NOTE — PROGRESS NOTES
Otolaryngology Note         Chief Complaint:     Patient presents with:  Cerumen Impaction: ear cleaning           History of Present Illness:     Alberto Vieira is a 45 year old male who presents today for ear cleaning.    He feels that the left ear has been plugged for the past 1.5 weeks since he has been swimming.  It felt like an earache at first, he had swelling in the canal that he feels has resolved and now he just feels plugged.  He has been squirting water in his ear with a baby nose sucker and suck it out but still feels plugged.  He denies pain with irrigation.      Hearing had felt decreased on the left at one point but now feels back to normal.  He last had ears cleaned 2 years ago.   No previous concerns with hearing.  + tinnitus, intermittent not bothersome  No vertigo, facial numbness or weakness.      No otalgia, otorrhea.    No hx of COM or otologic surgeries.          Medications:     Current Outpatient Rx   Medication Sig Dispense Refill     aspirin (ASA) 325 MG EC tablet Take 1 tablet (325 mg) by mouth 2 times daily 60 tablet 0     cyclobenzaprine (FLEXERIL) 10 MG tablet Take 1 tablet (10 mg) by mouth 3 times daily as needed for muscle spasms 30 tablet 1     HYDROcodone-acetaminophen (NORCO) 5-325 MG tablet Take 2 tablets by mouth nightly as needed for pain 18 tablet 0            Allergies:     Allergies: Percocet [oxycodone-acetaminophen]          Past Medical History:     No past medical history on file.         Past Surgical History:     Past Surgical History:   Procedure Laterality Date     ARTHROPLASTY HIP ANTERIOR Left 12/10/2019    Procedure: LEFT TOTAL HIP ARTHROPLASTY DIRECT ANTERIOR S/N;  Surgeon: Carlos Garcia MD;  Location: [facility] OR       ENT family history reviewed         Social History:     Social History     Tobacco Use     Smoking status: Former Smoker     Smokeless tobacco: Current User     Types: Chew   Substance Use Topics     Alcohol use: Yes     Drug use: No             Review of Systems:     ROS: See HPI         Physical Exam:     /80   Pulse 70   Temp 97.6  F (36.4  C) (Tympanic)   Wt 138.3 kg (305 lb)   SpO2 96%   BMI 43.76 kg/m      General - The patient is well nourished and well developed, and appears to have good nutritional status.  Alert and oriented to person and place, answers questions and cooperates with examination appropriately.   Head and Face - Normocephalic and atraumatic, with no gross asymmetry noted.  The facial nerve is intact, with strong symmetric movements.  Voice and Breathing - The patient was breathing comfortably without the use of accessory muscles. There was no wheezing, stridor. The patients voice was clear and strong, and had appropriate pitch and quality.  Ears - The ears were examined with binocular microscopy and with otoscope.  External ears normal. Right canal with minimal ceruminosis.  Left canal mildly excoriated, minimal ceruminosis.  The right ear was cleaned with #5, #3 sucker.   Right tympanic membrane is intact without effusion, retraction or mass. The left ear was cleaned with #5, #3 sucker.  Left tympanic membrane is irritated with mild myringitis, it is intact without effusion, retraction or mass.  Good movement with valsalva.    Eyes - Extraocular movements intact, sclera were not icteric or injected, conjunctiva were pink and moist.  Mouth - Examination of the oral cavity showed pink, healthy oral mucosa. Dentition in good condition. No lesions or ulcerations noted. The tongue was mobile and midline.   Throat - The walls of the oropharynx were smooth, pink, moist, symmetric, and had no lesions or ulcerations.  The tonsillar pillars and soft palate were symmetric. The uvula was midline on elevation.    Neck - Full range of motion on passive movement.  Palpation of the occipital, submental, submandibular, internal jugular chain, and supraclavicular nodes did not demonstrate any abnormal lymph nodes or masses.  Palpation of  the thyroid was soft and smooth, with no nodules or goiter appreciated.  The trachea was mobile and midline.  Nose - External contour is symmetric, no gross deflection or scars.  Nasal mucosa is pink and moist with no abnormal mucus.  The septum and turbinates were evaluated with nasal speculum, no polyps, masses, or purulence noted on examination.         Assessment and Plan:       ICD-10-CM    1. Acute swimmer's ear of left side  H60.332 ciprofloxacin-dexamethasone (CIPRODEX) 0.3-0.1 % otic suspension   2. Myringitis of left ear  H73.22      Start ciprodex to left ear - 4 drops twice daily x 7 days  Follow up in 10-14 days if symptoms are not completely resolved    Colleen FRANKEL  [facility]  12:19 PM  July 27, 2020    Colleen FRANKEL  [facility]

## 2020-07-27 ENCOUNTER — OFFICE VISIT (OUTPATIENT)
Dept: OTOLARYNGOLOGY | Facility: OTHER | Age: 45
End: 2020-07-27
Attending: NURSE PRACTITIONER
Payer: COMMERCIAL

## 2020-07-27 VITALS
BODY MASS INDEX: 43.76 KG/M2 | TEMPERATURE: 97.6 F | WEIGHT: 305 LBS | SYSTOLIC BLOOD PRESSURE: 112 MMHG | OXYGEN SATURATION: 96 % | HEART RATE: 70 BPM | DIASTOLIC BLOOD PRESSURE: 80 MMHG

## 2020-07-27 DIAGNOSIS — H60.332 ACUTE SWIMMER'S EAR OF LEFT SIDE: Primary | ICD-10-CM

## 2020-07-27 DIAGNOSIS — H73.22 MYRINGITIS OF LEFT EAR: ICD-10-CM

## 2020-07-27 PROCEDURE — 92504 EAR MICROSCOPY EXAMINATION: CPT | Performed by: NURSE PRACTITIONER

## 2020-07-27 PROCEDURE — 99213 OFFICE O/P EST LOW 20 MIN: CPT | Mod: 25 | Performed by: NURSE PRACTITIONER

## 2020-07-27 RX ORDER — CIPROFLOXACIN AND DEXAMETHASONE 3; 1 MG/ML; MG/ML
4 SUSPENSION/ DROPS AURICULAR (OTIC) 2 TIMES DAILY
Qty: 2.8 ML | Refills: 0 | Status: SHIPPED | OUTPATIENT
Start: 2020-07-27 | End: 2020-08-03

## 2020-07-27 ASSESSMENT — PAIN SCALES - GENERAL: PAINLEVEL: NO PAIN (0)

## 2020-07-27 NOTE — PATIENT INSTRUCTIONS
Start ciprodex to left ear - 4 drops twice daily x 7 days  Follow up in 10-14 days if symptoms are not completely resolved      Thank you for allowing Colleen FRANKEL and our ENT team to participate in your care.  If your medications are too expensive, please call my nurse at the number listed below.  We can possibly change this medication.    If you have a scheduling or an appointment question please contact our Health Unit Coordinator at their direct line [phone].   ALL nursing questions or concerns can be directed to your ENT nurses at: [phone] or [phone].

## 2020-07-27 NOTE — NURSING NOTE
"Chief Complaint   Patient presents with     Cerumen Impaction     ear cleaning       Initial /80   Pulse 70   Temp 97.6  F (36.4  C) (Tympanic)   Wt 138.3 kg (305 lb)   SpO2 96%   BMI 43.76 kg/m   Estimated body mass index is 43.76 kg/m  as calculated from the following:    Height as of 12/10/19: 1.778 m (5' 10\").    Weight as of this encounter: 138.3 kg (305 lb).  Medication Reconciliation: complete  Cherelle Tristan LPN  "

## 2020-07-27 NOTE — LETTER
7/27/2020         RE: Alberto Vieira  [address]  [place] [id number]        Dear Colleague,    Thank you for referring your patient, Alberto Vieira, to the [facility]. Please see a copy of my visit note below.    Otolaryngology Note         Chief Complaint:     Patient presents with:  Cerumen Impaction: ear cleaning           History of Present Illness:     Alberto Vieira is a 45 year old male who presents today for ear cleaning.    He feels that the left ear has been plugged for the past 1.5 weeks since he has been swimming.  It felt like an earache at first, he had swelling in the canal that he feels has resolved and now he just feels plugged.  He has been squirting water in his ear with a baby nose sucker and suck it out but still feels plugged.  He denies pain with irrigation.      Hearing had felt decreased on the left at one point but now feels back to normal.  He last had ears cleaned 2 years ago.   No previous concerns with hearing.  + tinnitus, intermittent not bothersome  No vertigo, facial numbness or weakness.      No otalgia, otorrhea.    No hx of COM or otologic surgeries.          Medications:     Current Outpatient Rx   Medication Sig Dispense Refill     aspirin (ASA) 325 MG EC tablet Take 1 tablet (325 mg) by mouth 2 times daily 60 tablet 0     cyclobenzaprine (FLEXERIL) 10 MG tablet Take 1 tablet (10 mg) by mouth 3 times daily as needed for muscle spasms 30 tablet 1     HYDROcodone-acetaminophen (NORCO) 5-325 MG tablet Take 2 tablets by mouth nightly as needed for pain 18 tablet 0            Allergies:     Allergies: Percocet [oxycodone-acetaminophen]          Past Medical History:     No past medical history on file.         Past Surgical History:     Past Surgical History:   Procedure Laterality Date     ARTHROPLASTY HIP ANTERIOR Left 12/10/2019    Procedure: LEFT TOTAL HIP ARTHROPLASTY DIRECT ANTERIOR S/N;  Surgeon: Carlos Garcia MD;  Location: [facility] OR        ENT family history reviewed         Social History:     Social History     Tobacco Use     Smoking status: Former Smoker     Smokeless tobacco: Current User     Types: Chew   Substance Use Topics     Alcohol use: Yes     Drug use: No            Review of Systems:     ROS: See HPI         Physical Exam:     /80   Pulse 70   Temp 97.6  F (36.4  C) (Tympanic)   Wt 138.3 kg (305 lb)   SpO2 96%   BMI 43.76 kg/m      General - The patient is well nourished and well developed, and appears to have good nutritional status.  Alert and oriented to person and place, answers questions and cooperates with examination appropriately.   Head and Face - Normocephalic and atraumatic, with no gross asymmetry noted.  The facial nerve is intact, with strong symmetric movements.  Voice and Breathing - The patient was breathing comfortably without the use of accessory muscles. There was no wheezing, stridor. The patients voice was clear and strong, and had appropriate pitch and quality.  Ears - The ears were examined with binocular microscopy and with otoscope.  External ears normal. Right canal with minimal ceruminosis.  Left canal mildly excoriated, minimal ceruminosis.  The right ear was cleaned with #5, #3 sucker.   Right tympanic membrane is intact without effusion, retraction or mass. The left ear was cleaned with #5, #3 sucker.  Left tympanic membrane is irritated with mild myringitis, it is intact without effusion, retraction or mass.  Good movement with valsalva.    Eyes - Extraocular movements intact, sclera were not icteric or injected, conjunctiva were pink and moist.  Mouth - Examination of the oral cavity showed pink, healthy oral mucosa. Dentition in good condition. No lesions or ulcerations noted. The tongue was mobile and midline.   Throat - The walls of the oropharynx were smooth, pink, moist, symmetric, and had no lesions or ulcerations.  The tonsillar pillars and soft palate were symmetric. The uvula  was midline on elevation.    Neck - Full range of motion on passive movement.  Palpation of the occipital, submental, submandibular, internal jugular chain, and supraclavicular nodes did not demonstrate any abnormal lymph nodes or masses.  Palpation of the thyroid was soft and smooth, with no nodules or goiter appreciated.  The trachea was mobile and midline.  Nose - External contour is symmetric, no gross deflection or scars.  Nasal mucosa is pink and moist with no abnormal mucus.  The septum and turbinates were evaluated with nasal speculum, no polyps, masses, or purulence noted on examination.         Assessment and Plan:       ICD-10-CM    1. Acute swimmer's ear of left side  H60.332 ciprofloxacin-dexamethasone (CIPRODEX) 0.3-0.1 % otic suspension   2. Myringitis of left ear  H73.22      Start ciprodex to left ear - 4 drops twice daily x 7 days  Follow up in 10-14 days if symptoms are not completely resolved    Colleen FRANKEL  [facility]  12:19 PM  July 27, 2020    Colleen FRANKEL  [facility]      Again, thank you for allowing me to participate in the care of your patient.        Sincerely,        Colleen Walsh NP

## 2023-04-25 NOTE — PATIENT INSTRUCTIONS
If you feel the ointment is helping you can use in both eyes.   We will call you with your labs results  We put in a orthopedic referral     Schedule your annual with Rody

## 2023-10-30 NOTE — TELEPHONE ENCOUNTER
Alberto Vieira, was discharged to home on 12/11/19  from [facility]. I spoke today with him regarding his  discharge.   He indicates he has receive(d) sufficient information upon discharge. Medications were reviewed in full on discharge, including: Medications to be started;  medications to be continued from preadmission and any side effects. Prescriptions were received at discharge and were able to be filled. Medications are being taken as prescribed.   He indicates he has  an appointment scheduled for 12/26/19 and has it written down.   He did not have any questions regarding discharge instructions or condition.  Per their request, the following employee (s) can be recognized for their outstanding services delivered:  Care was excellent.   Suggestions to improve service: Nothing indicated.   He was informed he  may receive a survey in the mail from the hospital. Asked if he  would kindly complete the survey in order for [facility] to know if services fully met patient needs.     Spontaneous, unlabored and symmetrical

## (undated) DEVICE — IRRIGATION-H2O 1000ML

## (undated) DEVICE — SUTURE-VICRYL #1 CP-1 VIOLET J468H

## (undated) DEVICE — CAUTERY PAD-POLYHESIVE II ADULT

## (undated) DEVICE — GLV-8.0 ORTHO PROTEXIS PI LF/PF

## (undated) DEVICE — AQUAMANTYS 6.0MM SEALER PROBE

## (undated) DEVICE — SUTURE-VICRYL #1 CT-1 UNDYED J261H

## (undated) DEVICE — SUTURE-VICRYL 2-0 CT-1 VCP259H

## (undated) DEVICE — BETADINE 5% STERILE OPHTHALMIC SOLUTION 1 OZ.

## (undated) DEVICE — GLV-8.5 PROTEXIS PI BLUE W/NEU-THERA LF/PF

## (undated) DEVICE — TAPE-DURAPORE 2 INCH

## (undated) DEVICE — LIGHT HANDLE COVER

## (undated) DEVICE — BONE WAX W31G

## (undated) DEVICE — SUTURE-VICRYL 1-0 UNDYED CT-1 J947H

## (undated) DEVICE — DRAPE-U DRAPE SPLIT SHEET 72" X 122"

## (undated) DEVICE — SUTURE-VICRYL 2-0 CP-1 VCP266H

## (undated) DEVICE — DRSG-AQUACEL AG 3.5" X 14" SURGICAL

## (undated) DEVICE — BDG-COBAN 6 INCH

## (undated) DEVICE — GLV-8.0 PROTEXIS PI BLUE W/NEU-THERA LF/PF

## (undated) DEVICE — PULSE LAVAGE IRRIGATION SYSTEM

## (undated) DEVICE — TAPE-MICROFOAM 4 INCH

## (undated) DEVICE — APPLICATOR-CHLORAPREP 26ML TINTED CHG 2%+ 70% IPA-SURGICAL

## (undated) DEVICE — CAUTERY-EXTENDED 4" EDGE COATED BLADE

## (undated) DEVICE — FLEXIBLE DRILL-25MM

## (undated) DEVICE — IRRIGATION-NACL 3000ML (BAG)

## (undated) DEVICE — KIT-HANA OR POSITIONING

## (undated) DEVICE — SYRINGE-20CC LUER LOCK

## (undated) DEVICE — BLADE-SAGITTAL 18MM X 90MM X 1.27MM

## (undated) DEVICE — IRRIGATION-NACL 1000ML

## (undated) DEVICE — Device

## (undated) DEVICE — SCD SLEEVE-KNEE REG.

## (undated) DEVICE — CANISTER-SUCTION 2000CC

## (undated) DEVICE — PACK-HIP-CUSTOM

## (undated) DEVICE — DRAPE-BAR 72" X 112" ORTHOARTS

## (undated) DEVICE — GLV-8.5 ORTHO PROTEXIS PI LF/PF

## (undated) DEVICE — DRAPE-VERTICAL ISOLATION

## (undated) DEVICE — NDL-18G 1 1/2" NON-SAFETY

## (undated) DEVICE — SUCTION TUBE-YANKAUR

## (undated) DEVICE — DRAPE-U DRAPE-CLEAR 47" X 51"

## (undated) DEVICE — LABEL-STERILE PREPRINTED FOR OR

## (undated) RX ORDER — PHENYLEPHRINE HCL IN 0.9% NACL 1 MG/10 ML
SYRINGE (ML) INTRAVENOUS
Status: DISPENSED
Start: 2019-12-10

## (undated) RX ORDER — ONDANSETRON 2 MG/ML
INJECTION INTRAMUSCULAR; INTRAVENOUS
Status: DISPENSED
Start: 2019-12-10

## (undated) RX ORDER — PROPOFOL 10 MG/ML
INJECTION, EMULSION INTRAVENOUS
Status: DISPENSED
Start: 2019-12-10

## (undated) RX ORDER — FENTANYL CITRATE 50 UG/ML
INJECTION, SOLUTION INTRAMUSCULAR; INTRAVENOUS
Status: DISPENSED
Start: 2019-12-10